# Patient Record
Sex: MALE | Race: BLACK OR AFRICAN AMERICAN | NOT HISPANIC OR LATINO | Employment: OTHER | RURAL
[De-identification: names, ages, dates, MRNs, and addresses within clinical notes are randomized per-mention and may not be internally consistent; named-entity substitution may affect disease eponyms.]

---

## 2017-01-25 ENCOUNTER — HISTORICAL (OUTPATIENT)
Dept: ADMINISTRATIVE | Facility: HOSPITAL | Age: 52
End: 2017-01-25

## 2017-01-26 LAB
LAB AP CLINICAL INFORMATION: NORMAL
LAB AP DIAGNOSIS - HISTORICAL: NORMAL
LAB AP GROSS PATHOLOGY - HISTORICAL: NORMAL
LAB AP SPECIMEN SUBMITTED - HISTORICAL: NORMAL

## 2017-02-16 ENCOUNTER — HISTORICAL (OUTPATIENT)
Dept: ADMINISTRATIVE | Facility: HOSPITAL | Age: 52
End: 2017-02-16

## 2020-10-19 ENCOUNTER — HISTORICAL (OUTPATIENT)
Dept: ADMINISTRATIVE | Facility: HOSPITAL | Age: 55
End: 2020-10-19

## 2020-10-19 LAB — SARS-COV+SARS-COV-2 AG RESP QL IA.RAPID: NEGATIVE

## 2021-04-14 RX ORDER — FAMOTIDINE 20 MG/1
20 TABLET, FILM COATED ORAL 2 TIMES DAILY
COMMUNITY

## 2021-04-14 RX ORDER — ASPIRIN 81 MG/1
81 TABLET ORAL DAILY
COMMUNITY

## 2021-04-14 RX ORDER — TAMSULOSIN HYDROCHLORIDE 0.4 MG/1
0.4 CAPSULE ORAL DAILY
COMMUNITY
End: 2023-05-22 | Stop reason: SDUPTHER

## 2021-04-14 RX ORDER — SILDENAFIL 100 MG/1
100 TABLET, FILM COATED ORAL DAILY PRN
COMMUNITY
End: 2022-09-27 | Stop reason: SDUPTHER

## 2021-04-14 RX ORDER — CETIRIZINE HYDROCHLORIDE 10 MG/1
10 TABLET ORAL DAILY
COMMUNITY

## 2021-04-14 RX ORDER — ASPIRIN 325 MG
50000 TABLET, DELAYED RELEASE (ENTERIC COATED) ORAL WEEKLY
COMMUNITY

## 2021-04-14 RX ORDER — CARVEDILOL 25 MG/1
25 TABLET ORAL 2 TIMES DAILY WITH MEALS
COMMUNITY
End: 2021-08-12 | Stop reason: SDUPTHER

## 2021-04-14 RX ORDER — ATORVASTATIN CALCIUM 20 MG/1
20 TABLET, FILM COATED ORAL DAILY
COMMUNITY
End: 2021-08-12 | Stop reason: SDUPTHER

## 2021-04-14 RX ORDER — CHLORTHALIDONE 25 MG/1
25 TABLET ORAL DAILY
COMMUNITY
End: 2021-08-12 | Stop reason: SDUPTHER

## 2021-04-14 RX ORDER — LISINOPRIL 20 MG/1
20 TABLET ORAL DAILY
COMMUNITY
End: 2022-02-10 | Stop reason: SDUPTHER

## 2021-06-28 ENCOUNTER — OFFICE VISIT (OUTPATIENT)
Dept: FAMILY MEDICINE | Facility: CLINIC | Age: 56
End: 2021-06-28

## 2021-06-28 VITALS
OXYGEN SATURATION: 95 % | WEIGHT: 292 LBS | TEMPERATURE: 99 F | DIASTOLIC BLOOD PRESSURE: 71 MMHG | SYSTOLIC BLOOD PRESSURE: 115 MMHG | BODY MASS INDEX: 39.55 KG/M2 | RESPIRATION RATE: 16 BRPM | HEIGHT: 72 IN | HEART RATE: 68 BPM

## 2021-06-28 DIAGNOSIS — Z12.11 COLON CANCER SCREENING: ICD-10-CM

## 2021-06-28 DIAGNOSIS — Z00.00 WELLNESS EXAMINATION: Primary | ICD-10-CM

## 2021-06-28 LAB
25(OH)D3 SERPL-MCNC: 26.5 NG/ML
ALBUMIN SERPL BCP-MCNC: 3.6 G/DL (ref 3.5–5)
ALBUMIN/GLOB SERPL: 0.9 {RATIO}
ALP SERPL-CCNC: 79 U/L (ref 45–115)
ALT SERPL W P-5'-P-CCNC: 66 U/L (ref 16–61)
ANION GAP SERPL CALCULATED.3IONS-SCNC: 8 MMOL/L (ref 7–16)
AST SERPL W P-5'-P-CCNC: 77 U/L (ref 15–37)
BASOPHILS # BLD AUTO: 0.03 K/UL (ref 0–0.2)
BASOPHILS NFR BLD AUTO: 0.6 % (ref 0–1)
BILIRUB SERPL-MCNC: 0.5 MG/DL (ref 0–1.2)
BILIRUB UR QL STRIP: NEGATIVE
BUN SERPL-MCNC: 16 MG/DL (ref 7–18)
BUN/CREAT SERPL: 21 (ref 6–20)
CALCIUM SERPL-MCNC: 9 MG/DL (ref 8.5–10.1)
CHLORIDE SERPL-SCNC: 103 MMOL/L (ref 98–107)
CHOLEST SERPL-MCNC: 139 MG/DL (ref 0–200)
CHOLEST/HDLC SERPL: 3.5 {RATIO}
CLARITY UR: ABNORMAL
CO2 SERPL-SCNC: 32 MMOL/L (ref 21–32)
COLOR UR: YELLOW
CREAT SERPL-MCNC: 0.75 MG/DL (ref 0.7–1.3)
DIFFERENTIAL METHOD BLD: ABNORMAL
EOSINOPHIL # BLD AUTO: 0.19 K/UL (ref 0–0.5)
EOSINOPHIL NFR BLD AUTO: 4.1 % (ref 1–4)
ERYTHROCYTE [DISTWIDTH] IN BLOOD BY AUTOMATED COUNT: 13.9 % (ref 11.5–14.5)
GLOBULIN SER-MCNC: 3.9 G/DL (ref 2–4)
GLUCOSE SERPL-MCNC: 91 MG/DL (ref 74–106)
GLUCOSE UR STRIP-MCNC: NEGATIVE MG/DL
HCT VFR BLD AUTO: 48.3 % (ref 40–54)
HDLC SERPL-MCNC: 40 MG/DL (ref 40–60)
HGB BLD-MCNC: 16 G/DL (ref 13.5–18)
IMM GRANULOCYTES # BLD AUTO: 0.02 K/UL (ref 0–0.04)
IMM GRANULOCYTES NFR BLD: 0.4 % (ref 0–0.4)
KETONES UR STRIP-SCNC: NEGATIVE MG/DL
LDLC SERPL CALC-MCNC: 72 MG/DL
LDLC/HDLC SERPL: 1.8 {RATIO}
LEUKOCYTE ESTERASE UR QL STRIP: NEGATIVE
LYMPHOCYTES # BLD AUTO: 1.72 K/UL (ref 1–4.8)
LYMPHOCYTES NFR BLD AUTO: 36.7 % (ref 27–41)
MCH RBC QN AUTO: 30.2 PG (ref 27–31)
MCHC RBC AUTO-ENTMCNC: 33.1 G/DL (ref 32–36)
MCV RBC AUTO: 91.1 FL (ref 80–96)
MONOCYTES # BLD AUTO: 0.58 K/UL (ref 0–0.8)
MONOCYTES NFR BLD AUTO: 12.4 % (ref 2–6)
MPC BLD CALC-MCNC: 12 FL (ref 9.4–12.4)
NEUTROPHILS # BLD AUTO: 2.15 K/UL (ref 1.8–7.7)
NEUTROPHILS NFR BLD AUTO: 45.8 % (ref 53–65)
NITRITE UR QL STRIP: NEGATIVE
NONHDLC SERPL-MCNC: 99 MG/DL
NRBC # BLD AUTO: 0 X10E3/UL
NRBC, AUTO (.00): 0 %
PH UR STRIP: 6 PH UNITS
PLATELET # BLD AUTO: 231 K/UL (ref 150–400)
POTASSIUM SERPL-SCNC: 3.3 MMOL/L (ref 3.5–5.1)
PROT SERPL-MCNC: 7.5 G/DL (ref 6.4–8.2)
PROT UR QL STRIP: NEGATIVE
PSA SERPL-MCNC: 0.96 NG/ML (ref 0–3.1)
RBC # BLD AUTO: 5.3 M/UL (ref 4.6–6.2)
RBC # UR STRIP: NEGATIVE /UL
SODIUM SERPL-SCNC: 140 MMOL/L (ref 136–145)
SP GR UR STRIP: 1.02
T4 FREE SERPL-MCNC: 0.72 NG/DL (ref 0.76–1.46)
TRIGL SERPL-MCNC: 137 MG/DL (ref 35–150)
TSH SERPL DL<=0.005 MIU/L-ACNC: 1.69 UIU/ML (ref 0.36–3.74)
UROBILINOGEN UR STRIP-ACNC: 2 MG/DL
VLDLC SERPL-MCNC: 27 MG/DL
WBC # BLD AUTO: 4.69 K/UL (ref 4.5–11)

## 2021-06-28 PROCEDURE — 85025 COMPLETE CBC W/AUTO DIFF WBC: CPT | Mod: ,,, | Performed by: CLINICAL MEDICAL LABORATORY

## 2021-06-28 PROCEDURE — 84439 ASSAY OF FREE THYROXINE: CPT | Mod: ,,, | Performed by: CLINICAL MEDICAL LABORATORY

## 2021-06-28 PROCEDURE — 85025 CBC WITH DIFFERENTIAL: ICD-10-PCS | Mod: ,,, | Performed by: CLINICAL MEDICAL LABORATORY

## 2021-06-28 PROCEDURE — 80061 LIPID PANEL: ICD-10-PCS | Mod: ,,, | Performed by: CLINICAL MEDICAL LABORATORY

## 2021-06-28 PROCEDURE — 84443 TSH: ICD-10-PCS | Mod: ,,, | Performed by: CLINICAL MEDICAL LABORATORY

## 2021-06-28 PROCEDURE — 84439 T4, FREE: ICD-10-PCS | Mod: ,,, | Performed by: CLINICAL MEDICAL LABORATORY

## 2021-06-28 PROCEDURE — 82306 VITAMIN D: ICD-10-PCS | Mod: ,,, | Performed by: CLINICAL MEDICAL LABORATORY

## 2021-06-28 PROCEDURE — 80053 COMPREHEN METABOLIC PANEL: CPT | Mod: ,,, | Performed by: CLINICAL MEDICAL LABORATORY

## 2021-06-28 PROCEDURE — 81003 URINALYSIS: ICD-10-PCS | Mod: QW,,, | Performed by: CLINICAL MEDICAL LABORATORY

## 2021-06-28 PROCEDURE — 81003 URINALYSIS AUTO W/O SCOPE: CPT | Mod: QW,,, | Performed by: CLINICAL MEDICAL LABORATORY

## 2021-06-28 PROCEDURE — 99396 PREV VISIT EST AGE 40-64: CPT | Mod: ,,, | Performed by: FAMILY MEDICINE

## 2021-06-28 PROCEDURE — 80053 COMPREHENSIVE METABOLIC PANEL: ICD-10-PCS | Mod: ,,, | Performed by: CLINICAL MEDICAL LABORATORY

## 2021-06-28 PROCEDURE — 84443 ASSAY THYROID STIM HORMONE: CPT | Mod: ,,, | Performed by: CLINICAL MEDICAL LABORATORY

## 2021-06-28 PROCEDURE — 82306 VITAMIN D 25 HYDROXY: CPT | Mod: ,,, | Performed by: CLINICAL MEDICAL LABORATORY

## 2021-06-28 PROCEDURE — G0103 PSA SCREENING: HCPCS | Mod: ,,, | Performed by: CLINICAL MEDICAL LABORATORY

## 2021-06-28 PROCEDURE — 80061 LIPID PANEL: CPT | Mod: ,,, | Performed by: CLINICAL MEDICAL LABORATORY

## 2021-06-28 PROCEDURE — G0103 PSA, SCREENING: ICD-10-PCS | Mod: ,,, | Performed by: CLINICAL MEDICAL LABORATORY

## 2021-06-28 PROCEDURE — 99396 PR PREVENTIVE VISIT,EST,40-64: ICD-10-PCS | Mod: ,,, | Performed by: FAMILY MEDICINE

## 2021-06-29 DIAGNOSIS — Z12.11 ENCOUNTER FOR SCREENING COLONOSCOPY: Primary | ICD-10-CM

## 2021-08-12 ENCOUNTER — OFFICE VISIT (OUTPATIENT)
Dept: FAMILY MEDICINE | Facility: CLINIC | Age: 56
End: 2021-08-12

## 2021-08-12 VITALS
DIASTOLIC BLOOD PRESSURE: 109 MMHG | SYSTOLIC BLOOD PRESSURE: 168 MMHG | RESPIRATION RATE: 20 BRPM | HEIGHT: 72 IN | HEART RATE: 76 BPM | WEIGHT: 293 LBS | OXYGEN SATURATION: 96 % | BODY MASS INDEX: 39.68 KG/M2

## 2021-08-12 DIAGNOSIS — E78.5 HYPERLIPIDEMIA, UNSPECIFIED HYPERLIPIDEMIA TYPE: ICD-10-CM

## 2021-08-12 DIAGNOSIS — I10 ESSENTIAL HYPERTENSION: Primary | ICD-10-CM

## 2021-08-12 DIAGNOSIS — E87.6 HYPOKALEMIA: ICD-10-CM

## 2021-08-12 PROCEDURE — 99214 OFFICE O/P EST MOD 30 MIN: CPT | Mod: ,,, | Performed by: INTERNAL MEDICINE

## 2021-08-12 PROCEDURE — 99214 PR OFFICE/OUTPT VISIT, EST, LEVL IV, 30-39 MIN: ICD-10-PCS | Mod: ,,, | Performed by: INTERNAL MEDICINE

## 2021-08-12 RX ORDER — CHLORTHALIDONE 25 MG/1
25 TABLET ORAL DAILY
Qty: 90 TABLET | Refills: 1 | Status: SHIPPED | OUTPATIENT
Start: 2021-08-12 | End: 2021-11-15

## 2021-08-12 RX ORDER — CARVEDILOL 25 MG/1
25 TABLET ORAL 2 TIMES DAILY WITH MEALS
Qty: 180 TABLET | Refills: 1 | Status: SHIPPED | OUTPATIENT
Start: 2021-08-12 | End: 2022-02-10 | Stop reason: SDUPTHER

## 2021-08-12 RX ORDER — ATORVASTATIN CALCIUM 20 MG/1
20 TABLET, FILM COATED ORAL DAILY
Qty: 90 TABLET | Refills: 1 | Status: SHIPPED | OUTPATIENT
Start: 2021-08-12 | End: 2021-11-15

## 2021-12-16 ENCOUNTER — OFFICE VISIT (OUTPATIENT)
Dept: FAMILY MEDICINE | Facility: CLINIC | Age: 56
End: 2021-12-16

## 2021-12-16 VITALS
SYSTOLIC BLOOD PRESSURE: 134 MMHG | RESPIRATION RATE: 20 BRPM | WEIGHT: 294.63 LBS | BODY MASS INDEX: 39.91 KG/M2 | OXYGEN SATURATION: 99 % | HEART RATE: 80 BPM | DIASTOLIC BLOOD PRESSURE: 90 MMHG | HEIGHT: 72 IN

## 2021-12-16 DIAGNOSIS — S99.911A INJURY OF RIGHT ANKLE, INITIAL ENCOUNTER: Primary | ICD-10-CM

## 2021-12-16 DIAGNOSIS — M25.512 LEFT SHOULDER PAIN, UNSPECIFIED CHRONICITY: ICD-10-CM

## 2021-12-16 DIAGNOSIS — I10 ESSENTIAL HYPERTENSION: Chronic | ICD-10-CM

## 2021-12-16 DIAGNOSIS — R35.0 URINARY FREQUENCY: ICD-10-CM

## 2021-12-16 DIAGNOSIS — Z12.5 SCREENING FOR PROSTATE CANCER: ICD-10-CM

## 2021-12-16 LAB
ANION GAP SERPL CALCULATED.3IONS-SCNC: 7 MMOL/L (ref 7–16)
BASOPHILS # BLD AUTO: 0.03 K/UL (ref 0–0.2)
BASOPHILS NFR BLD AUTO: 0.6 % (ref 0–1)
BILIRUB UR QL STRIP: NEGATIVE
BUN SERPL-MCNC: 19 MG/DL (ref 7–18)
BUN/CREAT SERPL: 20 (ref 6–20)
CALCIUM SERPL-MCNC: 9.6 MG/DL (ref 8.5–10.1)
CHLORIDE SERPL-SCNC: 104 MMOL/L (ref 98–107)
CLARITY UR: ABNORMAL
CO2 SERPL-SCNC: 33 MMOL/L (ref 21–32)
COLOR UR: YELLOW
CREAT SERPL-MCNC: 0.96 MG/DL (ref 0.7–1.3)
DIFFERENTIAL METHOD BLD: ABNORMAL
EOSINOPHIL # BLD AUTO: 0.22 K/UL (ref 0–0.5)
EOSINOPHIL NFR BLD AUTO: 4.2 % (ref 1–4)
ERYTHROCYTE [DISTWIDTH] IN BLOOD BY AUTOMATED COUNT: 13.6 % (ref 11.5–14.5)
GLUCOSE SERPL-MCNC: 93 MG/DL (ref 74–106)
GLUCOSE UR STRIP-MCNC: NEGATIVE MG/DL
HCT VFR BLD AUTO: 48.8 % (ref 40–54)
HGB BLD-MCNC: 16.4 G/DL (ref 13.5–18)
IMM GRANULOCYTES # BLD AUTO: 0.01 K/UL (ref 0–0.04)
IMM GRANULOCYTES NFR BLD: 0.2 % (ref 0–0.4)
KETONES UR STRIP-SCNC: ABNORMAL MG/DL
LEUKOCYTE ESTERASE UR QL STRIP: NEGATIVE
LYMPHOCYTES # BLD AUTO: 1.9 K/UL (ref 1–4.8)
LYMPHOCYTES NFR BLD AUTO: 35.8 % (ref 27–41)
MCH RBC QN AUTO: 31.1 PG (ref 27–31)
MCHC RBC AUTO-ENTMCNC: 33.6 G/DL (ref 32–36)
MCV RBC AUTO: 92.4 FL (ref 80–96)
MONOCYTES # BLD AUTO: 0.74 K/UL (ref 0–0.8)
MONOCYTES NFR BLD AUTO: 14 % (ref 2–6)
MPC BLD CALC-MCNC: 12 FL (ref 9.4–12.4)
NEUTROPHILS # BLD AUTO: 2.4 K/UL (ref 1.8–7.7)
NEUTROPHILS NFR BLD AUTO: 45.2 % (ref 53–65)
NITRITE UR QL STRIP: NEGATIVE
NRBC # BLD AUTO: 0 X10E3/UL
NRBC, AUTO (.00): 0 %
PH UR STRIP: 6 PH UNITS
PLATELET # BLD AUTO: 230 K/UL (ref 150–400)
POTASSIUM SERPL-SCNC: 3.4 MMOL/L (ref 3.5–5.1)
PROT UR QL STRIP: NEGATIVE
PSA SERPL-MCNC: 1.01 NG/ML (ref 0–3.1)
RBC # BLD AUTO: 5.28 M/UL (ref 4.6–6.2)
RBC # UR STRIP: NEGATIVE /UL
SODIUM SERPL-SCNC: 141 MMOL/L (ref 136–145)
SP GR UR STRIP: 1.02
UROBILINOGEN UR STRIP-ACNC: 4 MG/DL
WBC # BLD AUTO: 5.3 K/UL (ref 4.5–11)

## 2021-12-16 PROCEDURE — 99214 OFFICE O/P EST MOD 30 MIN: CPT | Mod: ,,, | Performed by: INTERNAL MEDICINE

## 2021-12-16 PROCEDURE — 85025 COMPLETE CBC W/AUTO DIFF WBC: CPT | Mod: ,,, | Performed by: CLINICAL MEDICAL LABORATORY

## 2021-12-16 PROCEDURE — 99214 PR OFFICE/OUTPT VISIT, EST, LEVL IV, 30-39 MIN: ICD-10-PCS | Mod: ,,, | Performed by: INTERNAL MEDICINE

## 2021-12-16 PROCEDURE — 81003 URINALYSIS, REFLEX TO URINE CULTURE: ICD-10-PCS | Mod: QW,,, | Performed by: CLINICAL MEDICAL LABORATORY

## 2021-12-16 PROCEDURE — 81003 URINALYSIS AUTO W/O SCOPE: CPT | Mod: QW,,, | Performed by: CLINICAL MEDICAL LABORATORY

## 2021-12-16 PROCEDURE — 80048 BASIC METABOLIC PNL TOTAL CA: CPT | Mod: ,,, | Performed by: CLINICAL MEDICAL LABORATORY

## 2021-12-16 PROCEDURE — 85025 CBC WITH DIFFERENTIAL: ICD-10-PCS | Mod: ,,, | Performed by: CLINICAL MEDICAL LABORATORY

## 2021-12-16 PROCEDURE — G0103 PSA, SCREENING: ICD-10-PCS | Mod: ,,, | Performed by: CLINICAL MEDICAL LABORATORY

## 2021-12-16 PROCEDURE — G0103 PSA SCREENING: HCPCS | Mod: ,,, | Performed by: CLINICAL MEDICAL LABORATORY

## 2021-12-16 PROCEDURE — 80048 BASIC METABOLIC PANEL: ICD-10-PCS | Mod: ,,, | Performed by: CLINICAL MEDICAL LABORATORY

## 2021-12-16 RX ORDER — NAPROXEN 500 MG/1
500 TABLET ORAL 2 TIMES DAILY PRN
Qty: 20 TABLET | Refills: 0 | Status: SHIPPED | OUTPATIENT
Start: 2021-12-16 | End: 2024-01-04

## 2021-12-30 ENCOUNTER — OFFICE VISIT (OUTPATIENT)
Dept: FAMILY MEDICINE | Facility: CLINIC | Age: 56
End: 2021-12-30

## 2021-12-30 VITALS
RESPIRATION RATE: 20 BRPM | HEIGHT: 72 IN | DIASTOLIC BLOOD PRESSURE: 95 MMHG | HEART RATE: 80 BPM | OXYGEN SATURATION: 95 % | WEIGHT: 294 LBS | BODY MASS INDEX: 39.82 KG/M2 | SYSTOLIC BLOOD PRESSURE: 144 MMHG

## 2021-12-30 DIAGNOSIS — E66.01 MORBID OBESITY: ICD-10-CM

## 2021-12-30 DIAGNOSIS — M19.012 OSTEOARTHRITIS OF LEFT SHOULDER, UNSPECIFIED OSTEOARTHRITIS TYPE: ICD-10-CM

## 2021-12-30 DIAGNOSIS — I10 ESSENTIAL HYPERTENSION: Primary | ICD-10-CM

## 2021-12-30 DIAGNOSIS — M19.071 DJD (DEGENERATIVE JOINT DISEASE), ANKLE AND FOOT, RIGHT: ICD-10-CM

## 2021-12-30 PROCEDURE — 99214 OFFICE O/P EST MOD 30 MIN: CPT | Mod: ,,, | Performed by: INTERNAL MEDICINE

## 2021-12-30 PROCEDURE — 99214 PR OFFICE/OUTPT VISIT, EST, LEVL IV, 30-39 MIN: ICD-10-PCS | Mod: ,,, | Performed by: INTERNAL MEDICINE

## 2021-12-30 RX ORDER — PHENTERMINE HYDROCHLORIDE 37.5 MG/1
37.5 TABLET ORAL
Qty: 30 TABLET | Refills: 0 | Status: SHIPPED | OUTPATIENT
Start: 2021-12-30 | End: 2022-01-29

## 2021-12-30 RX ORDER — NAPROXEN 500 MG/1
500 TABLET ORAL 2 TIMES DAILY PRN
Qty: 20 TABLET | Refills: 1 | Status: SHIPPED | OUTPATIENT
Start: 2021-12-30 | End: 2024-01-04

## 2021-12-30 NOTE — PROGRESS NOTES
Subjective:       Patient ID: Juan C Carranza is a 56 y.o. male.    Chief Complaint: Follow-up and Results    Patient is here today for check up evlauation. Patient recent labs reviewed and shows potassium level slightly low. Recommended to increase intake of potassium rich foods. All other labs reviewed and within normal limits. Patient recent xray of shoulder reviewed and shows mild arthritis as well as xray of ankle. Recommend weight loss and OTC extra strength tylenol. Discussed with patient about diet and exercise and discussed about starting adipex. Will try adipex for 6 months and follow monthly for progress. Will follow patient in 1 month.       Current Medications:    Current Outpatient Medications:     aspirin (ECOTRIN) 81 MG EC tablet, Take 81 mg by mouth once daily., Disp: , Rfl:     atorvastatin (LIPITOR) 20 MG tablet, TAKE 1 TABLET (20 MG TOTAL) BY MOUTH ONCE DAILY., Disp: 90 tablet, Rfl: 1    baloxavir marboxiL (XOFLUZA) 40 mg tablet, Take 40 mg by mouth. TAKE 2 TABS DAILY, Disp: , Rfl:     carvediloL (COREG) 25 MG tablet, Take 1 tablet (25 mg total) by mouth 2 (two) times daily with meals., Disp: 180 tablet, Rfl: 1    cetirizine (ZYRTEC) 10 MG tablet, Take 10 mg by mouth once daily., Disp: , Rfl:     chlorthalidone (HYGROTEN) 25 MG Tab, TAKE 1 TABLET (25 MG TOTAL) BY MOUTH ONCE DAILY., Disp: 90 tablet, Rfl: 1    cholecalciferol, vitamin D3, 1,250 mcg (50,000 unit) capsule, Take 50,000 Units by mouth once a week., Disp: , Rfl:     famotidine (PEPCID) 20 MG tablet, Take 20 mg by mouth 2 (two) times daily., Disp: , Rfl:     lisinopriL (PRINIVIL,ZESTRIL) 20 MG tablet, Take 20 mg by mouth once daily., Disp: , Rfl:     naproxen (NAPROSYN) 500 MG tablet, Take 1 tablet (500 mg total) by mouth 2 (two) times daily as needed., Disp: 20 tablet, Rfl: 0    sildenafiL (VIAGRA) 100 MG tablet, Take 100 mg by mouth daily as needed for Erectile Dysfunction., Disp: , Rfl:     tamsulosin (FLOMAX) 0.4 mg Cap,  Take 0.4 mg by mouth once daily., Disp: , Rfl:     Last Labs:     Office Visit on 12/16/2021   Component Date Value    Sodium 12/16/2021 141     Potassium 12/16/2021 3.4*    Chloride 12/16/2021 104     CO2 12/16/2021 33*    Anion Gap 12/16/2021 7     Glucose 12/16/2021 93     BUN 12/16/2021 19*    Creatinine 12/16/2021 0.96     BUN/Creatinine Ratio 12/16/2021 20     Calcium 12/16/2021 9.6     eGFR  12/16/2021 104     PSA Total 12/16/2021 1.010     Color, UA 12/16/2021 Yellow     Clarity, UA 12/16/2021 Turbid*    pH, UA 12/16/2021 6.0     Leukocytes, UA 12/16/2021 Negative     Nitrites, UA 12/16/2021 Negative     Protein, UA 12/16/2021 Negative     Glucose, UA 12/16/2021 Negative     Ketones, UA 12/16/2021 Trace     Urobilinogen, UA 12/16/2021 4.0*    Bilirubin, UA 12/16/2021 Negative     Blood, UA 12/16/2021 Negative     Specific Gravity, UA 12/16/2021 1.025     WBC 12/16/2021 5.30     RBC 12/16/2021 5.28     Hemoglobin 12/16/2021 16.4     Hematocrit 12/16/2021 48.8     MCV 12/16/2021 92.4     MCH 12/16/2021 31.1*    MCHC 12/16/2021 33.6     RDW 12/16/2021 13.6     Platelet Count 12/16/2021 230     MPV 12/16/2021 12.0     Neutrophils % 12/16/2021 45.2*    Lymphocytes % 12/16/2021 35.8     Monocytes % 12/16/2021 14.0*    Eosinophils % 12/16/2021 4.2*    Basophils % 12/16/2021 0.6     Immature Granulocytes % 12/16/2021 0.2     nRBC, Auto 12/16/2021 0.0     Neutrophils, Abs 12/16/2021 2.40     Lymphocytes, Absolute 12/16/2021 1.90     Monocytes, Absolute 12/16/2021 0.74     Eosinophils, Absolute 12/16/2021 0.22     Basophils, Absolute 12/16/2021 0.03     Immature Granulocytes, A* 12/16/2021 0.01     nRBC, Absolute 12/16/2021 0.00     Diff Type 12/16/2021 Auto        Last Imaging:  X-Ray Ankle Complete 3 View Right  Narrative: EXAMINATION:  XR ANKLE COMPLETE 3 VIEW RIGHT    CLINICAL HISTORY:  Unspecified injury of right ankle, initial  encounter    TECHNIQUE:  AP, lateral, and oblique images of the right ankle were performed.    COMPARISON:  None    FINDINGS:  There is no fracture or dislocation.  There are mild degenerative changes seen with ankle calcaneal enthesophytes.  Impression: Mild degenerative changes.    Electronically signed by: Luis E Sequeira  Date:    12/16/2021  Time:    16:14  X-Ray Shoulder 2 or More Views Left  Narrative: EXAMINATION:  XR SHOULDER COMPLETE 2 OR MORE VIEWS LEFT    CLINICAL HISTORY:  Pain in left shoulder    TECHNIQUE:  Two or three views of the left shoulder were performed.    COMPARISON:  None    FINDINGS:  There is no fracture.  No dislocation.  Mild degenerative glenohumeral and acromioclavicular changes.  Impression: Degenerative changes.    Electronically signed by: Luis E Sequeira  Date:    12/16/2021  Time:    16:14         Review of Systems   Musculoskeletal: Positive for arthralgias.   All other systems reviewed and are negative.        Objective:      Physical Exam  Vitals reviewed.   Constitutional:       Appearance: Normal appearance.   Cardiovascular:      Rate and Rhythm: Normal rate and regular rhythm.      Pulses: Normal pulses.      Heart sounds: Normal heart sounds.   Pulmonary:      Effort: Pulmonary effort is normal.      Breath sounds: Normal breath sounds.   Abdominal:      General: Abdomen is flat. Bowel sounds are normal.      Palpations: Abdomen is soft.   Musculoskeletal:         General: Normal range of motion.      Cervical back: Normal range of motion and neck supple.   Skin:     General: Skin is warm and dry.   Neurological:      General: No focal deficit present.      Mental Status: He is alert and oriented to person, place, and time. Mental status is at baseline.         Assessment:       1. Essential hypertension     2. DJD (degenerative joint disease), ankle and foot, right     3. Osteoarthritis of left shoulder, unspecified osteoarthritis type     4. Morbid obesity          Plan:          Juan C was seen today for follow-up and results.    Diagnoses and all orders for this visit:    Essential hypertension    DJD (degenerative joint disease), ankle and foot, right    Osteoarthritis of left shoulder, unspecified osteoarthritis type    Morbid obesity    Other orders  The following orders have not been finalized:  -     naproxen (NAPROSYN) 500 MG tablet  -     phentermine (ADIPEX-P) 37.5 mg tablet

## 2021-12-30 NOTE — PATIENT INSTRUCTIONS
Juan C was seen today for follow-up and results.    Diagnoses and all orders for this visit:    Essential hypertension    DJD (degenerative joint disease), ankle and foot, right    Osteoarthritis of left shoulder, unspecified osteoarthritis type    Morbid obesity    Other orders  The following orders have not been finalized:  -     naproxen (NAPROSYN) 500 MG tablet  -     phentermine (ADIPEX-P) 37.5 mg tablet

## 2022-02-10 NOTE — TELEPHONE ENCOUNTER
----- Message from Jessica Cordova sent at 2/10/2022  3:05 PM CST -----  Patient called requesting a refill. Patient needs a refill on cholesterol and blood pressure. He prefers the [facility] in [place]     [phone]

## 2022-02-11 RX ORDER — LISINOPRIL 20 MG/1
20 TABLET ORAL DAILY
Qty: 90 TABLET | Refills: 1 | Status: SHIPPED | OUTPATIENT
Start: 2022-02-11

## 2022-02-11 RX ORDER — ATORVASTATIN CALCIUM 20 MG/1
20 TABLET, FILM COATED ORAL DAILY
Qty: 90 TABLET | Refills: 1 | Status: SHIPPED | OUTPATIENT
Start: 2022-02-11 | End: 2022-05-27

## 2022-02-11 RX ORDER — CHLORTHALIDONE 25 MG/1
25 TABLET ORAL DAILY
Qty: 90 TABLET | Refills: 1 | Status: SHIPPED | OUTPATIENT
Start: 2022-02-11 | End: 2022-08-15 | Stop reason: SDUPTHER

## 2022-02-11 RX ORDER — CARVEDILOL 25 MG/1
25 TABLET ORAL 2 TIMES DAILY WITH MEALS
Qty: 180 TABLET | Refills: 1 | Status: SHIPPED | OUTPATIENT
Start: 2022-02-11 | End: 2022-11-15

## 2022-04-01 ENCOUNTER — OFFICE VISIT (OUTPATIENT)
Dept: FAMILY MEDICINE | Facility: CLINIC | Age: 57
End: 2022-04-01

## 2022-04-01 ENCOUNTER — HOSPITAL ENCOUNTER (OUTPATIENT)
Dept: RADIOLOGY | Facility: HOSPITAL | Age: 57
Discharge: HOME | End: 2022-04-01
Attending: INTERNAL MEDICINE

## 2022-04-01 VITALS
TEMPERATURE: 99 F | OXYGEN SATURATION: 97 % | BODY MASS INDEX: 38.92 KG/M2 | SYSTOLIC BLOOD PRESSURE: 121 MMHG | WEIGHT: 287.38 LBS | HEIGHT: 72 IN | HEART RATE: 79 BPM | DIASTOLIC BLOOD PRESSURE: 86 MMHG | RESPIRATION RATE: 18 BRPM

## 2022-04-01 DIAGNOSIS — K11.20 PAROTITIS: Primary | ICD-10-CM

## 2022-04-01 DIAGNOSIS — K11.20 PAROTITIS: ICD-10-CM

## 2022-04-01 LAB
BASOPHILS # BLD AUTO: 0.02 K/UL (ref 0–0.2)
BASOPHILS NFR BLD AUTO: 0.4 % (ref 0–1)
DIFFERENTIAL METHOD BLD: ABNORMAL
EOSINOPHIL # BLD AUTO: 0.09 K/UL (ref 0–0.5)
EOSINOPHIL NFR BLD AUTO: 1.8 % (ref 1–4)
ERYTHROCYTE [DISTWIDTH] IN BLOOD BY AUTOMATED COUNT: 13.6 % (ref 11.5–14.5)
HCT VFR BLD AUTO: 49.5 % (ref 40–54)
HGB BLD-MCNC: 16.3 G/DL (ref 13.5–18)
IMM GRANULOCYTES # BLD AUTO: 0.01 K/UL (ref 0–0.04)
IMM GRANULOCYTES NFR BLD: 0.2 % (ref 0–0.4)
LYMPHOCYTES # BLD AUTO: 1.6 K/UL (ref 1–4.8)
LYMPHOCYTES NFR BLD AUTO: 31.9 % (ref 27–41)
MCH RBC QN AUTO: 30.5 PG (ref 27–31)
MCHC RBC AUTO-ENTMCNC: 32.9 G/DL (ref 32–36)
MCV RBC AUTO: 92.5 FL (ref 80–96)
MONOCYTES # BLD AUTO: 0.67 K/UL (ref 0–0.8)
MONOCYTES NFR BLD AUTO: 13.4 % (ref 2–6)
MPC BLD CALC-MCNC: 12.1 FL (ref 9.4–12.4)
NEUTROPHILS # BLD AUTO: 2.62 K/UL (ref 1.8–7.7)
NEUTROPHILS NFR BLD AUTO: 52.3 % (ref 53–65)
NRBC # BLD AUTO: 0 X10E3/UL
NRBC, AUTO (.00): 0 %
PLATELET # BLD AUTO: 218 K/UL (ref 150–400)
RBC # BLD AUTO: 5.35 M/UL (ref 4.6–6.2)
WBC # BLD AUTO: 5.01 K/UL (ref 4.5–11)

## 2022-04-01 PROCEDURE — 70150 XR FACIAL BONES 3 OR MORE VIEW: ICD-10-PCS | Mod: 26,,, | Performed by: RADIOLOGY

## 2022-04-01 PROCEDURE — 85025 CBC WITH DIFFERENTIAL: ICD-10-PCS | Mod: ,,, | Performed by: CLINICAL MEDICAL LABORATORY

## 2022-04-01 PROCEDURE — 70150 X-RAY EXAM OF FACIAL BONES: CPT | Mod: TC

## 2022-04-01 PROCEDURE — 70150 X-RAY EXAM OF FACIAL BONES: CPT | Mod: 26,,, | Performed by: RADIOLOGY

## 2022-04-01 PROCEDURE — 99214 OFFICE O/P EST MOD 30 MIN: CPT | Mod: ,,, | Performed by: INTERNAL MEDICINE

## 2022-04-01 PROCEDURE — 99214 PR OFFICE/OUTPT VISIT, EST, LEVL IV, 30-39 MIN: ICD-10-PCS | Mod: ,,, | Performed by: INTERNAL MEDICINE

## 2022-04-01 PROCEDURE — 85025 COMPLETE CBC W/AUTO DIFF WBC: CPT | Mod: ,,, | Performed by: CLINICAL MEDICAL LABORATORY

## 2022-04-01 RX ORDER — SULFAMETHOXAZOLE AND TRIMETHOPRIM 800; 160 MG/1; MG/1
1 TABLET ORAL 2 TIMES DAILY
Qty: 20 TABLET | Refills: 0 | Status: SHIPPED | OUTPATIENT
Start: 2022-04-01 | End: 2022-04-05 | Stop reason: SDUPTHER

## 2022-04-04 NOTE — PROGRESS NOTES
Subjective:       Patient ID: Juan C Carranza is a 56 y.o. male.    Chief Complaint: raised area left neck (Hurts to chew, no trouble swallowing)    Patient is here today for a follow up evaluation. Patient has a complaint of a knot under his left jawbone and near his left ear. Patient states he noticed it 7 days ago. Will order X-ray left sided facial series and STAT CT left side facial. Will refer to ENT STAT. Will prescribe Bactrim DS PO BID #20. Will follow in 4 days.       Current Medications:    Current Outpatient Medications:     aspirin (ECOTRIN) 81 MG EC tablet, Take 81 mg by mouth once daily., Disp: , Rfl:     atorvastatin (LIPITOR) 20 MG tablet, Take 1 tablet (20 mg total) by mouth once daily., Disp: 90 tablet, Rfl: 1    baloxavir marboxiL (XOFLUZA) 40 mg tablet, Take 40 mg by mouth. TAKE 2 TABS DAILY, Disp: , Rfl:     carvediloL (COREG) 25 MG tablet, Take 1 tablet (25 mg total) by mouth 2 (two) times daily with meals., Disp: 180 tablet, Rfl: 1    cetirizine (ZYRTEC) 10 MG tablet, Take 10 mg by mouth once daily., Disp: , Rfl:     chlorthalidone (HYGROTEN) 25 MG Tab, Take 1 tablet (25 mg total) by mouth once daily., Disp: 90 tablet, Rfl: 1    cholecalciferol, vitamin D3, 1,250 mcg (50,000 unit) capsule, Take 50,000 Units by mouth once a week., Disp: , Rfl:     famotidine (PEPCID) 20 MG tablet, Take 20 mg by mouth 2 (two) times daily., Disp: , Rfl:     lisinopriL (PRINIVIL,ZESTRIL) 20 MG tablet, Take 1 tablet (20 mg total) by mouth once daily., Disp: 90 tablet, Rfl: 1    naproxen (NAPROSYN) 500 MG tablet, Take 1 tablet (500 mg total) by mouth 2 (two) times daily as needed., Disp: 20 tablet, Rfl: 0    naproxen (NAPROSYN) 500 MG tablet, Take 1 tablet (500 mg total) by mouth 2 (two) times daily as needed., Disp: 20 tablet, Rfl: 1    sildenafiL (VIAGRA) 100 MG tablet, Take 100 mg by mouth daily as needed for Erectile Dysfunction., Disp: , Rfl:     sulfamethoxazole-trimethoprim 800-160mg (BACTRIM DS)  800-160 mg Tab, Take 1 tablet by mouth 2 (two) times daily., Disp: 20 tablet, Rfl: 0    tamsulosin (FLOMAX) 0.4 mg Cap, Take 0.4 mg by mouth once daily., Disp: , Rfl:     Last Labs:     Office Visit on 04/01/2022   Component Date Value    WBC 04/01/2022 5.01     RBC 04/01/2022 5.35     Hemoglobin 04/01/2022 16.3     Hematocrit 04/01/2022 49.5     MCV 04/01/2022 92.5     MCH 04/01/2022 30.5     MCHC 04/01/2022 32.9     RDW 04/01/2022 13.6     Platelet Count 04/01/2022 218     MPV 04/01/2022 12.1     Neutrophils % 04/01/2022 52.3 (A)    Lymphocytes % 04/01/2022 31.9     Monocytes % 04/01/2022 13.4 (A)    Eosinophils % 04/01/2022 1.8     Basophils % 04/01/2022 0.4     Immature Granulocytes % 04/01/2022 0.2     nRBC, Auto 04/01/2022 0.0     Neutrophils, Abs 04/01/2022 2.62     Lymphocytes, Absolute 04/01/2022 1.60     Monocytes, Absolute 04/01/2022 0.67     Eosinophils, Absolute 04/01/2022 0.09     Basophils, Absolute 04/01/2022 0.02     Immature Granulocytes, A* 04/01/2022 0.01     nRBC, Absolute 04/01/2022 0.00     Diff Type 04/01/2022 Auto        Last Imaging:  X-Ray Facial Bones  3 Or More View  Narrative: EXAMINATION:  XR FACIAL BONES 3 OR MORE VIEW    CLINICAL HISTORY:  left side facial edema;  Sialoadenitis, unspecified    TECHNIQUE:  Four views of the facial bones were performed.    COMPARISON:  None    FINDINGS:  There is no fracture identified or radiopaque foreign body.  The soft tissues are not well evaluated by radiograph although no soft tissue calcification is detected.  Impression: As above    Electronically signed by: Luis E Sequeira  Date:    04/01/2022  Time:    11:46         Review of Systems   Integumentary:  Positive for mole/lesion.   All other systems reviewed and are negative.        Objective:      Physical Exam  Constitutional:       Appearance: Normal appearance. He is normal weight.   Cardiovascular:      Rate and Rhythm: Normal rate and regular rhythm.      Pulses:  Normal pulses.      Heart sounds: Normal heart sounds.   Pulmonary:      Effort: Pulmonary effort is normal.      Breath sounds: Normal breath sounds.   Abdominal:      General: Abdomen is flat. Bowel sounds are normal.      Palpations: Abdomen is soft.   Musculoskeletal:         General: Normal range of motion.      Cervical back: Normal range of motion and neck supple.   Skin:     General: Skin is warm and dry.      Findings: Lesion present.   Neurological:      General: No focal deficit present.      Mental Status: He is alert and oriented to person, place, and time. Mental status is at baseline.         Assessment:       1. Parotitis  CBC Auto Differential    X-Ray Facial Bones  3 Or More View    CBC Auto Differential    Ambulatory referral/consult to ENT    CT Maxillofacial W WO Contrast        Plan:         Juan C was seen today for raised area left neck.    Diagnoses and all orders for this visit:    Parotitis  -     CBC Auto Differential; Future  -     X-Ray Facial Bones  3 Or More View; Future  -     CBC Auto Differential  -     Ambulatory referral/consult to ENT; Future  -     CT Maxillofacial W WO Contrast; Future    Other orders  -     sulfamethoxazole-trimethoprim 800-160mg (BACTRIM DS) 800-160 mg Tab; Take 1 tablet by mouth 2 (two) times daily.

## 2022-04-04 NOTE — PATIENT INSTRUCTIONS
Juan C was seen today for raised area left neck.    Diagnoses and all orders for this visit:    Parotitis  -     CBC Auto Differential; Future  -     X-Ray Facial Bones  3 Or More View; Future  -     CBC Auto Differential  -     Ambulatory referral/consult to ENT; Future  -     CT Maxillofacial W WO Contrast; Future    Other orders  -     sulfamethoxazole-trimethoprim 800-160mg (BACTRIM DS) 800-160 mg Tab; Take 1 tablet by mouth 2 (two) times daily.

## 2022-04-05 ENCOUNTER — OFFICE VISIT (OUTPATIENT)
Dept: FAMILY MEDICINE | Facility: CLINIC | Age: 57
End: 2022-04-05

## 2022-04-05 VITALS
HEIGHT: 72 IN | WEIGHT: 283.81 LBS | BODY MASS INDEX: 38.44 KG/M2 | DIASTOLIC BLOOD PRESSURE: 73 MMHG | OXYGEN SATURATION: 96 % | TEMPERATURE: 98 F | RESPIRATION RATE: 18 BRPM | HEART RATE: 74 BPM | SYSTOLIC BLOOD PRESSURE: 109 MMHG

## 2022-04-05 DIAGNOSIS — K11.20 PAROTIDITIS: Primary | ICD-10-CM

## 2022-04-05 PROCEDURE — 99214 OFFICE O/P EST MOD 30 MIN: CPT | Mod: ,,, | Performed by: INTERNAL MEDICINE

## 2022-04-05 PROCEDURE — 99214 PR OFFICE/OUTPT VISIT, EST, LEVL IV, 30-39 MIN: ICD-10-PCS | Mod: ,,, | Performed by: INTERNAL MEDICINE

## 2022-04-05 RX ORDER — SULFAMETHOXAZOLE AND TRIMETHOPRIM 800; 160 MG/1; MG/1
1 TABLET ORAL 2 TIMES DAILY
Qty: 20 TABLET | Refills: 0 | Status: SHIPPED | OUTPATIENT
Start: 2022-04-05

## 2022-04-05 NOTE — PATIENT INSTRUCTIONS
Juan C was seen today for right side facial edema.    Diagnoses and all orders for this visit:    Parotiditis  -     Ambulatory referral/consult to ENT; Future    Other orders  The following orders have not been finalized:  -     sulfamethoxazole-trimethoprim 800-160mg (BACTRIM DS) 800-160 mg Tab

## 2022-04-05 NOTE — PROGRESS NOTES
Subjective:       Patient ID: Juan C Carranza is a 56 y.o. male.    Chief Complaint: right side facial edema (Patient states need to know if it would be lanced)    Patient is here today for a follow up evaluation. Patient has no new complaints. Patient states the knot is going down. Swelling has decreased on exam. Patient also states he is still applying the Bactrim ointment to the area 3 times a day. Patient still undergoing antibiotic treatment with Bactrim DS. Will extend Bactrim DS for 4 more days. Reviewed X-ray with patient. X-ray showed no signs of abnormality. Patient has not had CT done. Also has not yet followed with ENT. Will follow in 3 weeks.       Current Medications:    Current Outpatient Medications:     aspirin (ECOTRIN) 81 MG EC tablet, Take 81 mg by mouth once daily., Disp: , Rfl:     atorvastatin (LIPITOR) 20 MG tablet, Take 1 tablet (20 mg total) by mouth once daily., Disp: 90 tablet, Rfl: 1    baloxavir marboxiL (XOFLUZA) 40 mg tablet, Take 40 mg by mouth. TAKE 2 TABS DAILY, Disp: , Rfl:     carvediloL (COREG) 25 MG tablet, Take 1 tablet (25 mg total) by mouth 2 (two) times daily with meals., Disp: 180 tablet, Rfl: 1    cetirizine (ZYRTEC) 10 MG tablet, Take 10 mg by mouth once daily., Disp: , Rfl:     chlorthalidone (HYGROTEN) 25 MG Tab, Take 1 tablet (25 mg total) by mouth once daily., Disp: 90 tablet, Rfl: 1    cholecalciferol, vitamin D3, 1,250 mcg (50,000 unit) capsule, Take 50,000 Units by mouth once a week., Disp: , Rfl:     famotidine (PEPCID) 20 MG tablet, Take 20 mg by mouth 2 (two) times daily., Disp: , Rfl:     lisinopriL (PRINIVIL,ZESTRIL) 20 MG tablet, Take 1 tablet (20 mg total) by mouth once daily., Disp: 90 tablet, Rfl: 1    naproxen (NAPROSYN) 500 MG tablet, Take 1 tablet (500 mg total) by mouth 2 (two) times daily as needed., Disp: 20 tablet, Rfl: 0    naproxen (NAPROSYN) 500 MG tablet, Take 1 tablet (500 mg total) by mouth 2 (two) times daily as needed., Disp: 20  tablet, Rfl: 1    sildenafiL (VIAGRA) 100 MG tablet, Take 100 mg by mouth daily as needed for Erectile Dysfunction., Disp: , Rfl:     sulfamethoxazole-trimethoprim 800-160mg (BACTRIM DS) 800-160 mg Tab, Take 1 tablet by mouth 2 (two) times daily., Disp: 20 tablet, Rfl: 0    tamsulosin (FLOMAX) 0.4 mg Cap, Take 0.4 mg by mouth once daily., Disp: , Rfl:     Last Labs:     Office Visit on 04/01/2022   Component Date Value    WBC 04/01/2022 5.01     RBC 04/01/2022 5.35     Hemoglobin 04/01/2022 16.3     Hematocrit 04/01/2022 49.5     MCV 04/01/2022 92.5     MCH 04/01/2022 30.5     MCHC 04/01/2022 32.9     RDW 04/01/2022 13.6     Platelet Count 04/01/2022 218     MPV 04/01/2022 12.1     Neutrophils % 04/01/2022 52.3 (A)    Lymphocytes % 04/01/2022 31.9     Monocytes % 04/01/2022 13.4 (A)    Eosinophils % 04/01/2022 1.8     Basophils % 04/01/2022 0.4     Immature Granulocytes % 04/01/2022 0.2     nRBC, Auto 04/01/2022 0.0     Neutrophils, Abs 04/01/2022 2.62     Lymphocytes, Absolute 04/01/2022 1.60     Monocytes, Absolute 04/01/2022 0.67     Eosinophils, Absolute 04/01/2022 0.09     Basophils, Absolute 04/01/2022 0.02     Immature Granulocytes, A* 04/01/2022 0.01     nRBC, Absolute 04/01/2022 0.00     Diff Type 04/01/2022 Auto        Last Imaging:  X-Ray Facial Bones  3 Or More View  Narrative: EXAMINATION:  XR FACIAL BONES 3 OR MORE VIEW    CLINICAL HISTORY:  left side facial edema;  Sialoadenitis, unspecified    TECHNIQUE:  Four views of the facial bones were performed.    COMPARISON:  None    FINDINGS:  There is no fracture identified or radiopaque foreign body.  The soft tissues are not well evaluated by radiograph although no soft tissue calcification is detected.  Impression: As above    Electronically signed by: Luis E Sequeira  Date:    04/01/2022  Time:    11:46         Review of Systems   Integumentary:  Positive for mole/lesion.   All other systems reviewed and are negative.         Objective:      Physical Exam  Constitutional:       Appearance: Normal appearance. He is normal weight.   Cardiovascular:      Rate and Rhythm: Normal rate and regular rhythm.      Pulses: Normal pulses.      Heart sounds: Normal heart sounds.   Pulmonary:      Effort: Pulmonary effort is normal.      Breath sounds: Normal breath sounds.   Abdominal:      General: Abdomen is flat. Bowel sounds are normal.      Palpations: Abdomen is soft.   Musculoskeletal:         General: Normal range of motion.      Cervical back: Normal range of motion and neck supple.   Skin:     General: Skin is warm and dry.      Findings: Lesion present.   Neurological:      General: No focal deficit present.      Mental Status: He is alert and oriented to person, place, and time. Mental status is at baseline.         Assessment:       1. Parotiditis  Ambulatory referral/consult to ENT        Plan:         Juan C was seen today for right side facial edema.    Diagnoses and all orders for this visit:    Parotiditis  -     Ambulatory referral/consult to ENT; Future    Other orders  The following orders have not been finalized:  -     sulfamethoxazole-trimethoprim 800-160mg (BACTRIM DS) 800-160 mg Tab

## 2022-04-07 ENCOUNTER — OFFICE VISIT (OUTPATIENT)
Dept: OTOLARYNGOLOGY | Facility: CLINIC | Age: 57
End: 2022-04-07

## 2022-04-07 VITALS — WEIGHT: 283 LBS | HEIGHT: 72 IN | BODY MASS INDEX: 38.33 KG/M2

## 2022-04-07 DIAGNOSIS — K11.20 PAROTITIS: ICD-10-CM

## 2022-04-07 PROCEDURE — 99214 OFFICE O/P EST MOD 30 MIN: CPT | Mod: PBBFAC | Performed by: OTOLARYNGOLOGY

## 2022-04-07 PROCEDURE — 99204 OFFICE O/P NEW MOD 45 MIN: CPT | Mod: S$PBB,,, | Performed by: OTOLARYNGOLOGY

## 2022-04-07 PROCEDURE — 99204 PR OFFICE/OUTPT VISIT, NEW, LEVL IV, 45-59 MIN: ICD-10-PCS | Mod: S$PBB,,, | Performed by: OTOLARYNGOLOGY

## 2022-04-07 NOTE — PROGRESS NOTES
Subjective:       Patient ID: Juan C Carranza is a 56 y.o. male.    Chief Complaint: Neck Swelling (Patient is here for left Parotitis that started a week ago.)    Mr. Carranza presents to clinic with left facial swelling and pain x 1 week. Reports pain with chewing on left side. Reports use of antibiotics with swelling improvement.     Review of Systems   Constitutional: Negative for chills, fatigue and fever.   HENT: Positive for facial swelling. Negative for ear discharge and ear pain.          Objective:      Physical Exam  Constitutional:       Appearance: Normal appearance.   HENT:      Head: Normocephalic.      Right Ear: Tympanic membrane, ear canal and external ear normal.      Left Ear: Tympanic membrane, ear canal and external ear normal.      Nose: Nose normal.      Mouth/Throat:      Mouth: Mucous membranes are moist.      Pharynx: Oropharynx is clear.   Neck:     Pulmonary:      Effort: Pulmonary effort is normal.   Skin:     General: Skin is warm and dry.   Neurological:      Mental Status: He is alert.   Psychiatric:         Mood and Affect: Mood normal.         Behavior: Behavior is cooperative.         Assessment:       Problem List Items Addressed This Visit    None     Visit Diagnoses     Parotitis              Plan:   1. Complete antibiotic therapy and increase fluid intake   Follow up in 1 mos if not improved.   CT scan if not improved

## 2022-08-15 ENCOUNTER — TELEPHONE (OUTPATIENT)
Dept: FAMILY MEDICINE | Facility: CLINIC | Age: 57
End: 2022-08-15

## 2022-08-15 RX ORDER — CHLORTHALIDONE 25 MG/1
25 TABLET ORAL DAILY
Qty: 90 TABLET | Refills: 1 | Status: SHIPPED | OUTPATIENT
Start: 2022-08-15 | End: 2022-11-15

## 2022-08-15 NOTE — TELEPHONE ENCOUNTER
----- Message from Trev Diaz sent at 8/12/2022 12:30 PM CDT -----  Pt called and asked for a refill on this medicine       chlorthalidone (HYGROTEN) 25 MG Tab      Call back number [phone]

## 2022-08-15 NOTE — TELEPHONE ENCOUNTER
Call returned to pt about refill request. Pt is due for a f/u appt. Last refill for chlorthalidone was sent on 2/11/2022 was for a 6month supply. Pt needs to schedule appt and he should have enough medication for the rest of this month.     No answer. Voicemail full.

## 2022-08-25 RX ORDER — ATORVASTATIN CALCIUM 20 MG/1
20 TABLET, FILM COATED ORAL DAILY
Qty: 90 TABLET | Refills: 1 | Status: SHIPPED | OUTPATIENT
Start: 2022-08-25 | End: 2023-11-02 | Stop reason: SDUPTHER

## 2022-09-27 ENCOUNTER — OFFICE VISIT (OUTPATIENT)
Dept: FAMILY MEDICINE | Facility: CLINIC | Age: 57
End: 2022-09-27

## 2022-09-27 ENCOUNTER — HOSPITAL ENCOUNTER (EMERGENCY)
Facility: HOSPITAL | Age: 57
Discharge: HOME | End: 2022-09-27
Attending: EMERGENCY MEDICINE

## 2022-09-27 VITALS
HEIGHT: 72 IN | OXYGEN SATURATION: 98 % | BODY MASS INDEX: 39.77 KG/M2 | RESPIRATION RATE: 20 BRPM | DIASTOLIC BLOOD PRESSURE: 78 MMHG | WEIGHT: 293.63 LBS | HEART RATE: 86 BPM | SYSTOLIC BLOOD PRESSURE: 128 MMHG

## 2022-09-27 VITALS
TEMPERATURE: 98 F | DIASTOLIC BLOOD PRESSURE: 97 MMHG | OXYGEN SATURATION: 96 % | SYSTOLIC BLOOD PRESSURE: 138 MMHG | WEIGHT: 293 LBS | HEART RATE: 70 BPM | BODY MASS INDEX: 39.68 KG/M2 | HEIGHT: 72 IN | RESPIRATION RATE: 16 BRPM

## 2022-09-27 DIAGNOSIS — N52.9 ERECTILE DYSFUNCTION, UNSPECIFIED ERECTILE DYSFUNCTION TYPE: ICD-10-CM

## 2022-09-27 DIAGNOSIS — M54.50 ACUTE LEFT-SIDED LOW BACK PAIN WITHOUT SCIATICA: ICD-10-CM

## 2022-09-27 DIAGNOSIS — R06.02 SHORTNESS OF BREATH: ICD-10-CM

## 2022-09-27 DIAGNOSIS — L98.7 EXCESS SKIN OF BREAST: ICD-10-CM

## 2022-09-27 DIAGNOSIS — Z13.1 SCREENING FOR DIABETES MELLITUS: ICD-10-CM

## 2022-09-27 DIAGNOSIS — R07.9 CHEST PAIN, UNSPECIFIED TYPE: ICD-10-CM

## 2022-09-27 DIAGNOSIS — R07.89 ATYPICAL CHEST PAIN: Primary | ICD-10-CM

## 2022-09-27 DIAGNOSIS — R10.9 ACUTE LEFT FLANK PAIN: Primary | ICD-10-CM

## 2022-09-27 DIAGNOSIS — M54.9 DORSALGIA, UNSPECIFIED: ICD-10-CM

## 2022-09-27 DIAGNOSIS — R10.9 FLANK PAIN: ICD-10-CM

## 2022-09-27 DIAGNOSIS — G47.33 OSA (OBSTRUCTIVE SLEEP APNEA): ICD-10-CM

## 2022-09-27 LAB
ALBUMIN SERPL BCP-MCNC: 3.7 G/DL (ref 3.5–5)
ALP SERPL-CCNC: 85 U/L (ref 45–115)
ALT SERPL W P-5'-P-CCNC: 129 U/L (ref 16–61)
ANION GAP SERPL CALCULATED.3IONS-SCNC: 7 MMOL/L (ref 7–16)
ANION GAP SERPL CALCULATED.3IONS-SCNC: 9 MMOL/L (ref 7–16)
AST SERPL W P-5'-P-CCNC: 88 U/L (ref 15–37)
BASOPHILS # BLD AUTO: 0.02 K/UL (ref 0–0.2)
BASOPHILS # BLD AUTO: 0.04 K/UL (ref 0–0.2)
BASOPHILS NFR BLD AUTO: 0.5 % (ref 0–1)
BASOPHILS NFR BLD AUTO: 0.8 % (ref 0–1)
BILIRUB DIRECT SERPL-MCNC: 0.2 MG/DL (ref 0–0.2)
BILIRUB SERPL-MCNC: 0.6 MG/DL (ref ?–1.2)
BILIRUB UR QL STRIP: NEGATIVE
BUN SERPL-MCNC: 14 MG/DL (ref 7–18)
BUN SERPL-MCNC: 16 MG/DL (ref 7–18)
BUN/CREAT SERPL: 15 (ref 6–20)
BUN/CREAT SERPL: 19 (ref 6–20)
CALCIUM SERPL-MCNC: 9.3 MG/DL (ref 8.5–10.1)
CALCIUM SERPL-MCNC: 9.4 MG/DL (ref 8.5–10.1)
CHLORIDE SERPL-SCNC: 104 MMOL/L (ref 98–107)
CHLORIDE SERPL-SCNC: 104 MMOL/L (ref 98–107)
CLARITY UR: CLEAR
CO2 SERPL-SCNC: 29 MMOL/L (ref 21–32)
CO2 SERPL-SCNC: 31 MMOL/L (ref 21–32)
COLOR UR: ABNORMAL
CREAT SERPL-MCNC: 0.84 MG/DL (ref 0.7–1.3)
CREAT SERPL-MCNC: 0.91 MG/DL (ref 0.7–1.3)
CREAT UR-MCNC: 159 MG/DL (ref 39–259)
DIFFERENTIAL METHOD BLD: ABNORMAL
DIFFERENTIAL METHOD BLD: ABNORMAL
EGFR (NO RACE VARIABLE) (RUSH/TITUS): 101 ML/MIN/1.73M²
EGFR (NO RACE VARIABLE) (RUSH/TITUS): 98 ML/MIN/1.73M²
EOSINOPHIL # BLD AUTO: 0.22 K/UL (ref 0–0.5)
EOSINOPHIL # BLD AUTO: 0.23 K/UL (ref 0–0.5)
EOSINOPHIL NFR BLD AUTO: 4.7 % (ref 1–4)
EOSINOPHIL NFR BLD AUTO: 5 % (ref 1–4)
ERYTHROCYTE [DISTWIDTH] IN BLOOD BY AUTOMATED COUNT: 13.6 % (ref 11.5–14.5)
ERYTHROCYTE [DISTWIDTH] IN BLOOD BY AUTOMATED COUNT: 13.6 % (ref 11.5–14.5)
GLUCOSE SERPL-MCNC: 84 MG/DL (ref 74–106)
GLUCOSE SERPL-MCNC: 98 MG/DL (ref 74–106)
GLUCOSE UR STRIP-MCNC: NORMAL MG/DL
HCT VFR BLD AUTO: 46.7 % (ref 40–54)
HCT VFR BLD AUTO: 48.1 % (ref 40–54)
HGB BLD-MCNC: 15.9 G/DL (ref 13.5–18)
HGB BLD-MCNC: 16.6 G/DL (ref 13.5–18)
IMM GRANULOCYTES # BLD AUTO: 0.01 K/UL (ref 0–0.04)
IMM GRANULOCYTES # BLD AUTO: 0.02 K/UL (ref 0–0.04)
IMM GRANULOCYTES NFR BLD: 0.2 % (ref 0–0.4)
IMM GRANULOCYTES NFR BLD: 0.4 % (ref 0–0.4)
KETONES UR STRIP-SCNC: NEGATIVE MG/DL
LEUKOCYTE ESTERASE UR QL STRIP: NEGATIVE
LYMPHOCYTES # BLD AUTO: 1.6 K/UL (ref 1–4.8)
LYMPHOCYTES # BLD AUTO: 1.74 K/UL (ref 1–4.8)
LYMPHOCYTES NFR BLD AUTO: 35.4 % (ref 27–41)
LYMPHOCYTES NFR BLD AUTO: 36.4 % (ref 27–41)
MCH RBC QN AUTO: 31.6 PG (ref 27–31)
MCH RBC QN AUTO: 31.9 PG (ref 27–31)
MCHC RBC AUTO-ENTMCNC: 34 G/DL (ref 32–36)
MCHC RBC AUTO-ENTMCNC: 34.5 G/DL (ref 32–36)
MCV RBC AUTO: 92.3 FL (ref 80–96)
MCV RBC AUTO: 92.8 FL (ref 80–96)
MICROALBUMIN UR-MCNC: 0.8 MG/DL (ref 0–2.8)
MICROALBUMIN/CREAT RATIO PNL UR: 5 MG/G (ref 0–30)
MONOCYTES # BLD AUTO: 0.6 K/UL (ref 0–0.8)
MONOCYTES # BLD AUTO: 0.75 K/UL (ref 0–0.8)
MONOCYTES NFR BLD AUTO: 13.6 % (ref 2–6)
MONOCYTES NFR BLD AUTO: 15.2 % (ref 2–6)
MPC BLD CALC-MCNC: 11.7 FL (ref 9.4–12.4)
MPC BLD CALC-MCNC: 12.2 FL (ref 9.4–12.4)
NEUTROPHILS # BLD AUTO: 1.95 K/UL (ref 1.8–7.7)
NEUTROPHILS # BLD AUTO: 2.14 K/UL (ref 1.8–7.7)
NEUTROPHILS NFR BLD AUTO: 43.5 % (ref 53–65)
NEUTROPHILS NFR BLD AUTO: 44.3 % (ref 53–65)
NITRITE UR QL STRIP: NEGATIVE
NRBC # BLD AUTO: 0 X10E3/UL
NRBC # BLD AUTO: 0 X10E3/UL
NRBC, AUTO (.00): 0 %
NRBC, AUTO (.00): 0 %
PH UR STRIP: 7.5 PH UNITS
PLATELET # BLD AUTO: 211 K/UL (ref 150–400)
PLATELET # BLD AUTO: 218 K/UL (ref 150–400)
POTASSIUM SERPL-SCNC: 3.2 MMOL/L (ref 3.5–5.1)
POTASSIUM SERPL-SCNC: 3.4 MMOL/L (ref 3.5–5.1)
PROT SERPL-MCNC: 8.5 G/DL (ref 6.4–8.2)
PROT UR QL STRIP: NEGATIVE
PROT UR-MCNC: 14 MG/DL (ref 0–11.9)
RBC # BLD AUTO: 5.03 M/UL (ref 4.6–6.2)
RBC # BLD AUTO: 5.21 M/UL (ref 4.6–6.2)
RBC # UR STRIP: NEGATIVE /UL
SODIUM SERPL-SCNC: 139 MMOL/L (ref 136–145)
SODIUM SERPL-SCNC: 139 MMOL/L (ref 136–145)
SP GR UR STRIP: 1.02
TROPONIN I SERPL HS-MCNC: 6.5 PG/ML
UROBILINOGEN UR STRIP-ACNC: 2 MG/DL
WBC # BLD AUTO: 4.4 K/UL (ref 4.5–11)
WBC # BLD AUTO: 4.92 K/UL (ref 4.5–11)

## 2022-09-27 PROCEDURE — 80076 HEPATIC FUNCTION PANEL: ICD-10-PCS | Mod: ,,, | Performed by: CLINICAL MEDICAL LABORATORY

## 2022-09-27 PROCEDURE — 99283 EMERGENCY DEPT VISIT LOW MDM: CPT

## 2022-09-27 PROCEDURE — 81003 URINALYSIS AUTO W/O SCOPE: CPT | Mod: QW,,, | Performed by: CLINICAL MEDICAL LABORATORY

## 2022-09-27 PROCEDURE — 84156 PROTEIN, QUANTITATIVE, URINE RANDOM: ICD-10-PCS | Mod: ,,, | Performed by: CLINICAL MEDICAL LABORATORY

## 2022-09-27 PROCEDURE — 99214 PR OFFICE/OUTPT VISIT, EST, LEVL IV, 30-39 MIN: ICD-10-PCS | Mod: 25,,, | Performed by: INTERNAL MEDICINE

## 2022-09-27 PROCEDURE — 80076 HEPATIC FUNCTION PANEL: CPT | Mod: ,,, | Performed by: CLINICAL MEDICAL LABORATORY

## 2022-09-27 PROCEDURE — 82043 MICROALBUMIN / CREATININE RATIO URINE: ICD-10-PCS | Mod: ,,, | Performed by: CLINICAL MEDICAL LABORATORY

## 2022-09-27 PROCEDURE — 82043 UR ALBUMIN QUANTITATIVE: CPT | Mod: ,,, | Performed by: CLINICAL MEDICAL LABORATORY

## 2022-09-27 PROCEDURE — 80048 BASIC METABOLIC PNL TOTAL CA: CPT | Mod: ,,, | Performed by: CLINICAL MEDICAL LABORATORY

## 2022-09-27 PROCEDURE — 99282 PR EMERGENCY DEPT VISIT,LEVEL II: ICD-10-PCS | Mod: ,,, | Performed by: EMERGENCY MEDICINE

## 2022-09-27 PROCEDURE — 81003 URINALYSIS, REFLEX TO URINE CULTURE: ICD-10-PCS | Mod: QW,,, | Performed by: CLINICAL MEDICAL LABORATORY

## 2022-09-27 PROCEDURE — 36415 COLL VENOUS BLD VENIPUNCTURE: CPT | Performed by: EMERGENCY MEDICINE

## 2022-09-27 PROCEDURE — 85025 COMPLETE CBC W/AUTO DIFF WBC: CPT | Performed by: EMERGENCY MEDICINE

## 2022-09-27 PROCEDURE — 93000 PR ELECTROCARDIOGRAM, COMPLETE: ICD-10-PCS | Mod: ,,, | Performed by: INTERNAL MEDICINE

## 2022-09-27 PROCEDURE — 85025 CBC WITH DIFFERENTIAL: ICD-10-PCS | Mod: ,,, | Performed by: CLINICAL MEDICAL LABORATORY

## 2022-09-27 PROCEDURE — 80048 BASIC METABOLIC PANEL: ICD-10-PCS | Mod: ,,, | Performed by: CLINICAL MEDICAL LABORATORY

## 2022-09-27 PROCEDURE — 82570 ASSAY OF URINE CREATININE: CPT | Mod: ,,, | Performed by: CLINICAL MEDICAL LABORATORY

## 2022-09-27 PROCEDURE — 80048 BASIC METABOLIC PNL TOTAL CA: CPT | Performed by: EMERGENCY MEDICINE

## 2022-09-27 PROCEDURE — 83036 HEMOGLOBIN A1C: ICD-10-PCS | Mod: ,,, | Performed by: CLINICAL MEDICAL LABORATORY

## 2022-09-27 PROCEDURE — 84484 ASSAY OF TROPONIN QUANT: CPT | Performed by: EMERGENCY MEDICINE

## 2022-09-27 PROCEDURE — 93000 ELECTROCARDIOGRAM COMPLETE: CPT | Mod: ,,, | Performed by: INTERNAL MEDICINE

## 2022-09-27 PROCEDURE — 85025 COMPLETE CBC W/AUTO DIFF WBC: CPT | Mod: ,,, | Performed by: CLINICAL MEDICAL LABORATORY

## 2022-09-27 PROCEDURE — 99282 EMERGENCY DEPT VISIT SF MDM: CPT | Mod: ,,, | Performed by: EMERGENCY MEDICINE

## 2022-09-27 PROCEDURE — 99214 OFFICE O/P EST MOD 30 MIN: CPT | Mod: 25,,, | Performed by: INTERNAL MEDICINE

## 2022-09-27 PROCEDURE — 83036 HEMOGLOBIN GLYCOSYLATED A1C: CPT | Mod: ,,, | Performed by: CLINICAL MEDICAL LABORATORY

## 2022-09-27 PROCEDURE — 84156 ASSAY OF PROTEIN URINE: CPT | Mod: ,,, | Performed by: CLINICAL MEDICAL LABORATORY

## 2022-09-27 PROCEDURE — 82570 MICROALBUMIN / CREATININE RATIO URINE: ICD-10-PCS | Mod: ,,, | Performed by: CLINICAL MEDICAL LABORATORY

## 2022-09-27 NOTE — ED PROVIDER NOTES
Encounter Date: 9/27/2022       History     Chief Complaint   Patient presents with    Chest Pain     56 Y/O MALE PATIENT SENT FROM DR [facility] FOR AN ABNORMAL ECG.  ECG HERE IN THE ED IS NORMAL.  PATIENT SAYS HE DOES OCCASIONALLY HAVE SHARP CHEST PAINS.  HE DENIES ANY SHORTNESS OF BREATH OR REMITTING OR EXACERBATING FACTORS.      Review of patient's allergies indicates:  No Known Allergies  Past Medical History:   Diagnosis Date    Cardiomegaly     Hyperlipidemia     Hypertension     Palpitations      History reviewed. No pertinent surgical history.  Family History   Family history unknown: Yes     Social History     Tobacco Use    Smoking status: Never    Smokeless tobacco: Never   Substance Use Topics    Alcohol use: Yes     Review of Systems   All other systems reviewed and are negative.    Physical Exam     Initial Vitals [09/27/22 1713]   BP Pulse Resp Temp SpO2   (!) 146/102 67 16 98.1 °F (36.7 °C) 97 %      MAP       --         Physical Exam    Nursing note and vitals reviewed.  Constitutional: He appears well-developed and well-nourished.   HENT:   Head: Normocephalic and atraumatic.   Nose: Nose normal.   Mouth/Throat: Oropharynx is clear and moist.   Eyes: Conjunctivae and EOM are normal. Pupils are equal, round, and reactive to light.   Neck: Neck supple.   Normal range of motion.  Cardiovascular:  Normal rate, regular rhythm and normal heart sounds.           Pulmonary/Chest: Breath sounds normal.   Abdominal: Abdomen is soft. Bowel sounds are normal.   Musculoskeletal:         General: Normal range of motion.      Cervical back: Normal range of motion and neck supple.     Neurological: He is alert and oriented to person, place, and time. He has normal strength. GCS score is 15. GCS eye subscore is 4. GCS verbal subscore is 5. GCS motor subscore is 6.   Skin: Skin is warm and dry. Capillary refill takes less than 2 seconds.   Psychiatric: He has a normal mood and affect. Thought content normal.        Medical Screening Exam   See Full Note    ED Course   Procedures  Labs Reviewed   BASIC METABOLIC PANEL - Abnormal; Notable for the following components:       Result Value    Potassium 3.4 (*)     All other components within normal limits   CBC WITH DIFFERENTIAL - Abnormal; Notable for the following components:    WBC 4.40 (*)     MCH 31.6 (*)     Neutrophils % 44.3 (*)     Monocytes % 13.6 (*)     Eosinophils % 5.0 (*)     All other components within normal limits   TROPONIN I - Normal   CBC W/ AUTO DIFFERENTIAL    Narrative:     The following orders were created for panel order CBC auto differential.  Procedure                               Abnormality         Status                     ---------                               -----------         ------                     CBC with Differential[[id number]]        Abnormal            Final result                 Please view results for these tests on the individual orders.          Imaging Results    None          Medications - No data to display              ED Course as of 09/29/22 0251   Tue Sep 27, 2022   1926 Troponin I High Sensitivity: 6.5 [BB]   1926 Potassium(!): 3.4 [BB]   1926 Glucose: 98 [BB]   1926 BUN: 16 [BB]   1926 Creatinine: 0.84 [BB]   1926 WBC(!): 4.40 [BB]   1926 Hemoglobin: 15.9 [BB]   1926 Hematocrit: 46.7 [BB]   1926 Platelets: 211 [BB]   1929 On my examination patient describes pains is sharp and intermittent only lasting for a few seconds as a time with no other associated symptoms.  Pain does not sound cardiac, EKG was reviewed and is normal.  Troponin is normal.  I feel this is very unlikely to be cardiac. [BB]      ED Course User Index  [BB] Josue De La Cruz MD          Clinical Impression:   Final diagnoses:  [R07.89] Atypical chest pain (Primary)      ED Disposition Condition    Discharge Stable          ED Prescriptions    None       Follow-up Information    None          Josue De La Cruz MD  09/29/22 0251

## 2022-09-27 NOTE — PROGRESS NOTES
Pt referred [facility] for abnormal EKG. Pt is asymptomatic with no chest pain or sob at this time. He is alert,ambulatory, and oriented x4. His will go to [facility] via pov. Report was called to ED and given to ALBERTO Lama. EKG has been scanned into pt's chart.

## 2022-09-27 NOTE — ED TRIAGE NOTES
Pt presents to ed with c/o having chest pain on and off for over a week and today Dr. Martinez ran some test on him and then told him to come to the ER

## 2022-09-28 ENCOUNTER — TELEPHONE (OUTPATIENT)
Dept: EMERGENCY MEDICINE | Facility: HOSPITAL | Age: 57
End: 2022-09-28

## 2022-09-28 LAB
EST. AVERAGE GLUCOSE BLD GHB EST-MCNC: 110 MG/DL
HBA1C MFR BLD HPLC: 5.9 % (ref 4.5–6.6)

## 2022-09-28 RX ORDER — SILDENAFIL 100 MG/1
100 TABLET, FILM COATED ORAL DAILY PRN
Qty: 12 TABLET | Refills: 2 | Status: SHIPPED | OUTPATIENT
Start: 2022-09-28

## 2022-09-28 NOTE — DISCHARGE INSTRUCTIONS
Continue home medications as prescribed.  Follow up in clinic with primary care provider in 2-3 days for recheck.  Return to emergency department for any worsening or further problems.

## 2022-09-28 NOTE — PATIENT INSTRUCTIONS
Juan C was seen today for flank pain.    Diagnoses and all orders for this visit:    Acute left flank pain  -     Basic Metabolic Panel; Future  -     CBC Auto Differential; Future  -     Hepatic Function Panel; Future  -     Urinalysis, Reflex to Urine Culture; Future  -     Protein, Random Urine; Future  -     Microalbumin/Creatinine Ratio, Urine; Future  -     X-Ray KUB; Future  -     Basic Metabolic Panel  -     CBC Auto Differential  -     Hepatic Function Panel  -     Urinalysis, Reflex to Urine Culture  -     Protein, Random Urine  -     Microalbumin/Creatinine Ratio, Urine  -     US Retroperitoneal Complete; Future    Acute left-sided low back pain without sciatica    Erectile dysfunction, unspecified erectile dysfunction type    HERIBERTO (obstructive sleep apnea)  -     Ambulatory referral/consult to Sleep Disorders; Future    Chest pain, unspecified type  -     POCT EKG 12-LEAD (NOT FOR [facility] USE)  -     Exercise Stress - EKG; Future  -     Refer to Emergency Dept.    Shortness of breath    Screening for diabetes mellitus  -     Hemoglobin A1C; Future  -     Hemoglobin A1C    Dorsalgia, unspecified  -     X-Ray Lumbar Spine Ap And Lateral; Future    Flank pain  -     X-Ray Ribs 3 Views Bilateral; Future  -     US Retroperitoneal Complete; Future    Excess skin of breast  -     US Breast Left Complete; Future    Other orders  -     sildenafiL (VIAGRA) 100 MG tablet; Take 1 tablet (100 mg total) by mouth daily as needed for Erectile Dysfunction.

## 2022-09-28 NOTE — PROGRESS NOTES
Subjective:       Patient ID: Juan C Carranza is a 57 y.o. male.    Chief Complaint: Flank Pain (Left side pain)    Patient is here today for check up evaluation. Patient states he is still having left sided flank pain. States feels he has more fat on left side near axilla. States the pain is more on his left back side and feels like needles. Denies pain on palpation. Will xray both sides, KUB, and L spine. Will also order renal US of left kidney/ureter/bladder. Requesting LFT and BMP. Will order all other labs as well. Patient reports his wife is concerned the fat near his axilla is a problem with his heart. Appears to be possible fatty pad/gynecomastia. Will order US of left breast and stress test. Patient states he does snore at night and has night sweats. Will order sleep study. Will follow in 3 weeks.     Current Medications:    Current Outpatient Medications:     aspirin (ECOTRIN) 81 MG EC tablet, Take 81 mg by mouth once daily., Disp: , Rfl:     atorvastatin (LIPITOR) 20 MG tablet, Take 1 tablet (20 mg total) by mouth once daily., Disp: 90 tablet, Rfl: 1    carvediloL (COREG) 25 MG tablet, Take 1 tablet (25 mg total) by mouth 2 (two) times daily with meals., Disp: 180 tablet, Rfl: 1    chlorthalidone (HYGROTEN) 25 MG Tab, Take 1 tablet (25 mg total) by mouth once daily., Disp: 90 tablet, Rfl: 1    baloxavir marboxiL (XOFLUZA) 40 mg tablet, Take 40 mg by mouth. TAKE 2 TABS DAILY, Disp: , Rfl:     cetirizine (ZYRTEC) 10 MG tablet, Take 10 mg by mouth once daily., Disp: , Rfl:     cholecalciferol, vitamin D3, 1,250 mcg (50,000 unit) capsule, Take 50,000 Units by mouth once a week., Disp: , Rfl:     famotidine (PEPCID) 20 MG tablet, Take 20 mg by mouth 2 (two) times daily., Disp: , Rfl:     lisinopriL (PRINIVIL,ZESTRIL) 20 MG tablet, Take 1 tablet (20 mg total) by mouth once daily. (Patient not taking: Reported on 9/27/2022), Disp: 90 tablet, Rfl: 1    naproxen (NAPROSYN) 500 MG tablet, Take 1 tablet (500  mg total) by mouth 2 (two) times daily as needed. (Patient not taking: Reported on 9/27/2022), Disp: 20 tablet, Rfl: 0    naproxen (NAPROSYN) 500 MG tablet, Take 1 tablet (500 mg total) by mouth 2 (two) times daily as needed. (Patient not taking: Reported on 9/27/2022), Disp: 20 tablet, Rfl: 1    sildenafiL (VIAGRA) 100 MG tablet, Take 1 tablet (100 mg total) by mouth daily as needed for Erectile Dysfunction., Disp: 12 tablet, Rfl: 2    sulfamethoxazole-trimethoprim 800-160mg (BACTRIM DS) 800-160 mg Tab, Take 1 tablet by mouth 2 (two) times daily. (Patient not taking: Reported on 9/27/2022), Disp: 20 tablet, Rfl: 0    tamsulosin (FLOMAX) 0.4 mg Cap, Take 0.4 mg by mouth once daily., Disp: , Rfl:     Last Labs:     Admission on 09/27/2022, Discharged on 09/27/2022   Component Date Value    Sodium 09/27/2022 139     Potassium 09/27/2022 3.4 (L)     Chloride 09/27/2022 104     CO2 09/27/2022 29     Anion Gap 09/27/2022 9     Glucose 09/27/2022 98     BUN 09/27/2022 16     Creatinine 09/27/2022 0.84     BUN/Creatinine Ratio 09/27/2022 19     Calcium 09/27/2022 9.3     eGFR 09/27/2022 101     Troponin I High Sensitiv* 09/27/2022 6.5     WBC 09/27/2022 4.40 (L)     RBC 09/27/2022 5.03     Hemoglobin 09/27/2022 15.9     Hematocrit 09/27/2022 46.7     MCV 09/27/2022 92.8     MCH 09/27/2022 31.6 (H)     MCHC 09/27/2022 34.0     RDW 09/27/2022 13.6     Platelet Count 09/27/2022 211     MPV 09/27/2022 11.7     Neutrophils % 09/27/2022 44.3 (L)     Lymphocytes % 09/27/2022 36.4     Monocytes % 09/27/2022 13.6 (H)     Eosinophils % 09/27/2022 5.0 (H)     Basophils % 09/27/2022 0.5     Immature Granulocytes % 09/27/2022 0.2     nRBC, Auto 09/27/2022 0.0     Neutrophils, Abs 09/27/2022 1.95     Lymphocytes, Absolute 09/27/2022 1.60     Monocytes, Absolute 09/27/2022 0.60     Eosinophils, Absolute 09/27/2022 0.22     Basophils, Absolute 09/27/2022 0.02     Immature Granulocytes, A* 09/27/2022  0.01     nRBC, Absolute 09/27/2022 0.00     Diff Type 09/27/2022 Auto    Office Visit on 09/27/2022   Component Date Value    Sodium 09/27/2022 139     Potassium 09/27/2022 3.2 (L)     Chloride 09/27/2022 104     CO2 09/27/2022 31     Anion Gap 09/27/2022 7     Glucose 09/27/2022 84     BUN 09/27/2022 14     Creatinine 09/27/2022 0.91     BUN/Creatinine Ratio 09/27/2022 15     Calcium 09/27/2022 9.4     eGFR 09/27/2022 98     Hemoglobin A1C 09/27/2022 5.9     Estimated Average Glucose 09/27/2022 110     Total Protein 09/27/2022 8.5 (H)     Albumin 09/27/2022 3.7     Bilirubin, Total 09/27/2022 0.6     Bilirubin, Direct 09/27/2022 0.2     AST 09/27/2022 88 (H)     ALT 09/27/2022 129 (H)     Alk Phos 09/27/2022 85     Color, UA 09/27/2022 Light-Yellow     Clarity, UA 09/27/2022 Clear     pH, UA 09/27/2022 7.5     Leukocytes, UA 09/27/2022 Negative     Nitrites, UA 09/27/2022 Negative     Protein, UA 09/27/2022 Negative     Glucose, UA 09/27/2022 Normal     Ketones, UA 09/27/2022 Negative     Urobilinogen, UA 09/27/2022 2 (A)     Bilirubin, UA 09/27/2022 Negative     Blood, UA 09/27/2022 Negative     Specific Gravity, UA 09/27/2022 1.022     Protein, Urine 09/27/2022 14.0 (H)     Creatinine, Urine 09/27/2022 159     Microalbumin 09/27/2022 0.8     Microalbumin/Creatinine * 09/27/2022 5.0     WBC 09/27/2022 4.92     RBC 09/27/2022 5.21     Hemoglobin 09/27/2022 16.6     Hematocrit 09/27/2022 48.1     MCV 09/27/2022 92.3     MCH 09/27/2022 31.9 (H)     MCHC 09/27/2022 34.5     RDW 09/27/2022 13.6     Platelet Count 09/27/2022 218     MPV 09/27/2022 12.2     Neutrophils % 09/27/2022 43.5 (L)     Lymphocytes % 09/27/2022 35.4     Monocytes % 09/27/2022 15.2 (H)     Eosinophils % 09/27/2022 4.7 (H)     Basophils % 09/27/2022 0.8     Immature Granulocytes % 09/27/2022 0.4     nRBC, Auto 09/27/2022 0.0     Neutrophils, Abs 09/27/2022 2.14     Lymphocytes, Absolute  09/27/2022 1.74     Monocytes, Absolute 09/27/2022 0.75     Eosinophils, Absolute 09/27/2022 0.23     Basophils, Absolute 09/27/2022 0.04     Immature Granulocytes, A* 09/27/2022 0.02     nRBC, Absolute 09/27/2022 0.00     Diff Type 09/27/2022 Auto        Last Imaging:  X-Ray Ribs 3 Views Bilateral  Narrative: EXAMINATION:  XR RIBS 3 VIEWS BILATERAL    CLINICAL HISTORY:  Unspecified abdominal pain    TECHNIQUE:  AP and oblique views of the right and left ribs.  Total of 7 images.    COMPARISON:  Chest radiograph 09/04/2018    FINDINGS:  No fracture is identified.  Lungs are clear.  No pneumothorax.  Impression: No acute findings.    Electronically signed by: Luis E Sequeira  Date:    09/27/2022  Time:    16:47  X-Ray KUB  Narrative: EXAMINATION:  XR KUB    CLINICAL HISTORY:  Unspecified abdominal pain    TECHNIQUE:  Single AP supine view of the abdomen (KUB) was performed    COMPARISON:  None    FINDINGS:  Bowel gas pattern is nonobstructed.  Mild colonic stool.  No abnormal calcifications.  Degenerative changes of the spine and hips.  Impression: Mild colonic stool.    Electronically signed by: Luis E Sequeira  Date:    09/27/2022  Time:    16:46  X-Ray Lumbar Spine Ap And Lateral  Narrative: EXAMINATION:  XR LUMBAR SPINE AP AND LATERAL    CLINICAL HISTORY:  Low back pain, progressive neurologic deficit;Dorsalgia, unspecified    TECHNIQUE:  AP, lateral and spot images were performed of the lumbar spine.    COMPARISON:  None    FINDINGS:  There are degenerative endplate and facet changes seen throughout the lumbar spine most notably at L4-5 and L5-S1.  There is mild degree of chronic appearing height loss seen of the L3 vertebral body.  No acute fracture detected.  Impression: Degenerative changes.    Electronically signed by: Luis E Sequeira  Date:    09/27/2022  Time:    16:45         Review of Systems   All other systems reviewed and are negative.      Objective:      Physical Exam  Vitals reviewed.   Constitutional:        Appearance: Normal appearance.   Cardiovascular:      Rate and Rhythm: Normal rate and regular rhythm.      Pulses: Normal pulses.      Heart sounds: Normal heart sounds.   Pulmonary:      Effort: Pulmonary effort is normal.      Breath sounds: Normal breath sounds.   Abdominal:      General: Abdomen is flat. Bowel sounds are normal.      Palpations: Abdomen is soft.   Musculoskeletal:         General: Normal range of motion.      Cervical back: Normal range of motion and neck supple.   Skin:     General: Skin is warm and dry.   Neurological:      General: No focal deficit present.      Mental Status: He is alert and oriented to person, place, and time. Mental status is at baseline.       Assessment:       1. Acute left flank pain  Basic Metabolic Panel    CBC Auto Differential    Hepatic Function Panel    Urinalysis, Reflex to Urine Culture    Protein, Random Urine    Microalbumin/Creatinine Ratio, Urine    X-Ray KUB    Basic Metabolic Panel    CBC Auto Differential    Hepatic Function Panel    Urinalysis, Reflex to Urine Culture    Protein, Random Urine    Microalbumin/Creatinine Ratio, Urine    US Retroperitoneal Complete      2. Acute left-sided low back pain without sciatica        3. Erectile dysfunction, unspecified erectile dysfunction type        4. HERIBERTO (obstructive sleep apnea)  Ambulatory referral/consult to Sleep Disorders      5. Chest pain, unspecified type  POCT EKG 12-LEAD (NOT FOR [facility] USE)    Exercise Stress - EKG    Refer to Emergency Dept.      6. Shortness of breath        7. Screening for diabetes mellitus  Hemoglobin A1C    Hemoglobin A1C      8. Dorsalgia, unspecified  X-Ray Lumbar Spine Ap And Lateral      9. Flank pain  X-Ray Ribs 3 Views Bilateral    US Retroperitoneal Complete      10. Excess skin of breast  US Breast Left Complete           Plan:         Juan C was seen today for flank pain.    Diagnoses and all orders for this visit:    Acute left flank pain  -     Basic Metabolic  Panel; Future  -     CBC Auto Differential; Future  -     Hepatic Function Panel; Future  -     Urinalysis, Reflex to Urine Culture; Future  -     Protein, Random Urine; Future  -     Microalbumin/Creatinine Ratio, Urine; Future  -     X-Ray KUB; Future  -     Basic Metabolic Panel  -     CBC Auto Differential  -     Hepatic Function Panel  -     Urinalysis, Reflex to Urine Culture  -     Protein, Random Urine  -     Microalbumin/Creatinine Ratio, Urine  -     US Retroperitoneal Complete; Future    Acute left-sided low back pain without sciatica    Erectile dysfunction, unspecified erectile dysfunction type    HERIBERTO (obstructive sleep apnea)  -     Ambulatory referral/consult to Sleep Disorders; Future    Chest pain, unspecified type  -     POCT EKG 12-LEAD (NOT FOR [facility] USE)  -     Exercise Stress - EKG; Future  -     Refer to Emergency Dept.    Shortness of breath    Screening for diabetes mellitus  -     Hemoglobin A1C; Future  -     Hemoglobin A1C    Dorsalgia, unspecified  -     X-Ray Lumbar Spine Ap And Lateral; Future    Flank pain  -     X-Ray Ribs 3 Views Bilateral; Future  -     US Retroperitoneal Complete; Future    Excess skin of breast  -     US Breast Left Complete; Future    Other orders  -     sildenafiL (VIAGRA) 100 MG tablet; Take 1 tablet (100 mg total) by mouth daily as needed for Erectile Dysfunction.

## 2022-09-29 DIAGNOSIS — R07.9 CHEST PAIN, UNSPECIFIED TYPE: Primary | ICD-10-CM

## 2022-10-07 ENCOUNTER — HOSPITAL ENCOUNTER (OUTPATIENT)
Dept: CARDIOLOGY | Facility: HOSPITAL | Age: 57
Discharge: HOME | End: 2022-10-07
Attending: INTERNAL MEDICINE

## 2022-10-07 VITALS
DIASTOLIC BLOOD PRESSURE: 81 MMHG | HEART RATE: 93 BPM | WEIGHT: 293 LBS | SYSTOLIC BLOOD PRESSURE: 113 MMHG | BODY MASS INDEX: 39.68 KG/M2 | HEIGHT: 72 IN

## 2022-10-07 DIAGNOSIS — R07.9 CHEST PAIN, UNSPECIFIED TYPE: ICD-10-CM

## 2022-10-07 PROCEDURE — 93017 CV STRESS TEST TRACING ONLY: CPT

## 2022-10-07 PROCEDURE — 93018 CV STRESS TEST I&R ONLY: CPT | Mod: ,,, | Performed by: INTERNAL MEDICINE

## 2022-10-07 PROCEDURE — 93016 EXERCISE STRESS - EKG (CUPID ONLY): ICD-10-PCS | Mod: ,,, | Performed by: NURSE PRACTITIONER

## 2022-10-07 PROCEDURE — 93016 CV STRESS TEST SUPVJ ONLY: CPT | Mod: ,,, | Performed by: NURSE PRACTITIONER

## 2022-10-07 PROCEDURE — 93018 EXERCISE STRESS - EKG (CUPID ONLY): ICD-10-PCS | Mod: ,,, | Performed by: INTERNAL MEDICINE

## 2022-11-16 LAB
CV STRESS BASE HR: 93 BPM
DIASTOLIC BLOOD PRESSURE: 81 MMHG
OHS CV CPX 1 MINUTE RECOVERY HEART RATE: 98 BPM
OHS CV CPX 85 PERCENT MAX PREDICTED HEART RATE MALE: 138
OHS CV CPX MAX PREDICTED HEART RATE: 162
OHS CV CPX PATIENT IS FEMALE: 0
OHS CV CPX PATIENT IS MALE: 1
OHS CV CPX PEAK DIASTOLIC BLOOD PRESSURE: 84 MMHG
OHS CV CPX PEAK HEAR RATE: 134 BPM
OHS CV CPX PEAK RATE PRESSURE PRODUCT: NORMAL
OHS CV CPX PEAK SYSTOLIC BLOOD PRESSURE: 162 MMHG
OHS CV CPX PERCENT MAX PREDICTED HEART RATE ACHIEVED: 83
OHS CV CPX RATE PRESSURE PRODUCT PRESENTING: NORMAL
STRESS ECHO POST EXERCISE DUR MIN: 6 MINUTES
STRESS ECHO POST EXERCISE DUR SEC: 18 SECONDS
SYSTOLIC BLOOD PRESSURE: 113 MMHG

## 2023-01-03 ENCOUNTER — TELEPHONE (OUTPATIENT)
Dept: SLEEP MEDICINE | Facility: CLINIC | Age: 58
End: 2023-01-03

## 2023-02-07 ENCOUNTER — TELEPHONE (OUTPATIENT)
Dept: SLEEP MEDICINE | Facility: CLINIC | Age: 58
End: 2023-02-07

## 2023-03-03 RX ORDER — CHLORTHALIDONE 25 MG/1
TABLET ORAL
Qty: 90 TABLET | Refills: 1 | OUTPATIENT
Start: 2023-03-03

## 2023-03-03 NOTE — TELEPHONE ENCOUNTER
Call made to pt regarding med refill; pt will need to have an appt due to last seen in sept of 22'. No answer left message.

## 2023-03-13 ENCOUNTER — TELEPHONE (OUTPATIENT)
Dept: FAMILY MEDICINE | Facility: CLINIC | Age: 58
End: 2023-03-13

## 2023-03-13 NOTE — TELEPHONE ENCOUNTER
1317- call made to pt regarding med refill; pt will need to have an appt due to last seen in Sept of 22'. No answer left message.

## 2023-05-16 ENCOUNTER — TELEPHONE (OUTPATIENT)
Dept: SLEEP MEDICINE | Facility: CLINIC | Age: 58
End: 2023-05-16

## 2023-05-22 ENCOUNTER — OFFICE VISIT (OUTPATIENT)
Dept: FAMILY MEDICINE | Facility: CLINIC | Age: 58
End: 2023-05-22

## 2023-05-22 VITALS
HEART RATE: 86 BPM | HEIGHT: 73 IN | DIASTOLIC BLOOD PRESSURE: 79 MMHG | SYSTOLIC BLOOD PRESSURE: 128 MMHG | TEMPERATURE: 98 F | RESPIRATION RATE: 17 BRPM | BODY MASS INDEX: 38.86 KG/M2 | WEIGHT: 293.19 LBS | OXYGEN SATURATION: 97 %

## 2023-05-22 DIAGNOSIS — Z13.1 SCREENING FOR DIABETES MELLITUS (DM): ICD-10-CM

## 2023-05-22 DIAGNOSIS — E78.00 HIGH CHOLESTEROL: ICD-10-CM

## 2023-05-22 DIAGNOSIS — E55.9 VITAMIN D DEFICIENCY: ICD-10-CM

## 2023-05-22 DIAGNOSIS — M54.10 RADICULOPATHY, UNSPECIFIED SPINAL REGION: ICD-10-CM

## 2023-05-22 DIAGNOSIS — M25.512 LEFT SHOULDER PAIN, UNSPECIFIED CHRONICITY: ICD-10-CM

## 2023-05-22 DIAGNOSIS — I10 ESSENTIAL HYPERTENSION: Primary | ICD-10-CM

## 2023-05-22 DIAGNOSIS — Z12.5 SCREENING PSA (PROSTATE SPECIFIC ANTIGEN): ICD-10-CM

## 2023-05-22 DIAGNOSIS — E66.9 CLASS 2 OBESITY WITH BODY MASS INDEX (BMI) OF 38.0 TO 38.9 IN ADULT, UNSPECIFIED OBESITY TYPE, UNSPECIFIED WHETHER SERIOUS COMORBIDITY PRESENT: ICD-10-CM

## 2023-05-22 LAB
ANION GAP SERPL CALCULATED.3IONS-SCNC: 7 MMOL/L (ref 7–16)
BASOPHILS # BLD AUTO: 0.03 K/UL (ref 0–0.2)
BASOPHILS NFR BLD AUTO: 0.7 % (ref 0–1)
BUN SERPL-MCNC: 13 MG/DL (ref 7–18)
BUN/CREAT SERPL: 16 (ref 6–20)
CALCIUM SERPL-MCNC: 9.3 MG/DL (ref 8.5–10.1)
CHLORIDE SERPL-SCNC: 106 MMOL/L (ref 98–107)
CO2 SERPL-SCNC: 27 MMOL/L (ref 21–32)
CREAT SERPL-MCNC: 0.82 MG/DL (ref 0.7–1.3)
DIFFERENTIAL METHOD BLD: ABNORMAL
EGFR (NO RACE VARIABLE) (RUSH/TITUS): 102 ML/MIN/1.73M2
EOSINOPHIL # BLD AUTO: 0.16 K/UL (ref 0–0.5)
EOSINOPHIL NFR BLD AUTO: 3.5 % (ref 1–4)
ERYTHROCYTE [DISTWIDTH] IN BLOOD BY AUTOMATED COUNT: 13.4 % (ref 11.5–14.5)
EST. AVERAGE GLUCOSE BLD GHB EST-MCNC: 110 MG/DL
GLUCOSE SERPL-MCNC: 108 MG/DL (ref 74–106)
HBA1C MFR BLD HPLC: 5.9 % (ref 4.5–6.6)
HCT VFR BLD AUTO: 46.9 % (ref 40–54)
HGB BLD-MCNC: 15.7 G/DL (ref 13.5–18)
IMM GRANULOCYTES # BLD AUTO: 0.01 K/UL (ref 0–0.04)
IMM GRANULOCYTES NFR BLD: 0.2 % (ref 0–0.4)
LYMPHOCYTES # BLD AUTO: 1.53 K/UL (ref 1–4.8)
LYMPHOCYTES NFR BLD AUTO: 33.3 % (ref 27–41)
MCH RBC QN AUTO: 30.8 PG (ref 27–31)
MCHC RBC AUTO-ENTMCNC: 33.5 G/DL (ref 32–36)
MCV RBC AUTO: 92 FL (ref 80–96)
MONOCYTES # BLD AUTO: 0.48 K/UL (ref 0–0.8)
MONOCYTES NFR BLD AUTO: 10.5 % (ref 2–6)
MPC BLD CALC-MCNC: 11.4 FL (ref 9.4–12.4)
NEUTROPHILS # BLD AUTO: 2.38 K/UL (ref 1.8–7.7)
NEUTROPHILS NFR BLD AUTO: 51.8 % (ref 53–65)
NRBC # BLD AUTO: 0 X10E3/UL
NRBC, AUTO (.00): 0 %
PLATELET # BLD AUTO: 280 K/UL (ref 150–400)
POTASSIUM SERPL-SCNC: 3.3 MMOL/L (ref 3.5–5.1)
PSA SERPL-MCNC: 0.99 NG/ML
RBC # BLD AUTO: 5.1 M/UL (ref 4.6–6.2)
SODIUM SERPL-SCNC: 137 MMOL/L (ref 136–145)
TSH SERPL DL<=0.005 MIU/L-ACNC: 2.43 UIU/ML (ref 0.36–3.74)
WBC # BLD AUTO: 4.59 K/UL (ref 4.5–11)

## 2023-05-22 PROCEDURE — 99214 PR OFFICE/OUTPT VISIT, EST, LEVL IV, 30-39 MIN: ICD-10-PCS | Mod: ,,, | Performed by: INTERNAL MEDICINE

## 2023-05-22 PROCEDURE — 80048 BASIC METABOLIC PANEL: ICD-10-PCS | Mod: ,,, | Performed by: CLINICAL MEDICAL LABORATORY

## 2023-05-22 PROCEDURE — 85025 COMPLETE CBC W/AUTO DIFF WBC: CPT | Mod: ,,, | Performed by: CLINICAL MEDICAL LABORATORY

## 2023-05-22 PROCEDURE — 99214 OFFICE O/P EST MOD 30 MIN: CPT | Mod: ,,, | Performed by: INTERNAL MEDICINE

## 2023-05-22 PROCEDURE — G0103 PSA SCREENING: HCPCS | Mod: ,,, | Performed by: CLINICAL MEDICAL LABORATORY

## 2023-05-22 PROCEDURE — 80048 BASIC METABOLIC PNL TOTAL CA: CPT | Mod: ,,, | Performed by: CLINICAL MEDICAL LABORATORY

## 2023-05-22 PROCEDURE — 84443 TSH: ICD-10-PCS | Mod: ,,, | Performed by: CLINICAL MEDICAL LABORATORY

## 2023-05-22 PROCEDURE — 83036 HEMOGLOBIN GLYCOSYLATED A1C: CPT | Mod: ,,, | Performed by: CLINICAL MEDICAL LABORATORY

## 2023-05-22 PROCEDURE — 85025 CBC WITH DIFFERENTIAL: ICD-10-PCS | Mod: ,,, | Performed by: CLINICAL MEDICAL LABORATORY

## 2023-05-22 PROCEDURE — 84443 ASSAY THYROID STIM HORMONE: CPT | Mod: ,,, | Performed by: CLINICAL MEDICAL LABORATORY

## 2023-05-22 PROCEDURE — 83036 HEMOGLOBIN A1C: ICD-10-PCS | Mod: ,,, | Performed by: CLINICAL MEDICAL LABORATORY

## 2023-05-22 PROCEDURE — G0103 PSA, SCREENING: ICD-10-PCS | Mod: ,,, | Performed by: CLINICAL MEDICAL LABORATORY

## 2023-05-22 NOTE — PROGRESS NOTES
Subjective:       Patient ID: Juan C Carranza is a 57 y.o. male.    Chief Complaint: Results (Blood work done today ) and Shoulder Pain (/Left )    HPI  .  57-year-old gentleman presents with a history of hypertension, dyslipidemia, vitamin-D deficiency and chronic obesity.    Hypertension and the dyslipidemia or chronic medical problems.    Patient is requesting blood work to be done today.    Patient complains of numbness and discomfort intermittent pain in the left upper extremity.  He has occasional neck discomfort.  Patient also complains of intermittent chest pain.    He states that he still has occasional nocturia.  The plan will be to x-ray his C-spine I think he has a radiculopathy in the left upper extremity therefore going to x-rays neck.  Because of the chest pain I will order an exercise stress test he does have cardiac risk factors.  I am going to order Flomax 0.4 mg 1 p.o. q.h.s. for his history of BPH and nocturia.  Patient also requesting that we check a PSA.    In regards to the radiculopathy will start Neurontin 300 mg 1 p.o. q.h.s..  Blood work/health maintenance will check and screening for diabetes by checking hemoglobin A1c check a BNP   Will also check a TSH because he is obese and I am going to order a baseline CBC.    Recommend low carb diet, exercise and lifestyle modification.    Current Medications:    Current Outpatient Medications:     aspirin (ECOTRIN) 81 MG EC tablet, Take 81 mg by mouth once daily., Disp: , Rfl:     atorvastatin (LIPITOR) 20 MG tablet, Take 1 tablet (20 mg total) by mouth once daily., Disp: 90 tablet, Rfl: 1    baloxavir marboxiL (XOFLUZA) 40 mg tablet, Take 40 mg by mouth. TAKE 2 TABS DAILY, Disp: , Rfl:     carvediloL (COREG) 25 MG tablet, TAKE 1 TABLET (25 MG TOTAL) BY MOUTH 2 (TWO) TIMES DAILY WITH MEALS., Disp: 180 tablet, Rfl: 1    cetirizine (ZYRTEC) 10 MG tablet, Take 10 mg by mouth once daily., Disp: , Rfl:     chlorthalidone (HYGROTEN) 25 MG Tab, TAKE 1  TABLET (25 MG TOTAL) BY MOUTH ONCE DAILY., Disp: 90 tablet, Rfl: 1    cholecalciferol, vitamin D3, 1,250 mcg (50,000 unit) capsule, Take 50,000 Units by mouth once a week., Disp: , Rfl:     famotidine (PEPCID) 20 MG tablet, Take 20 mg by mouth 2 (two) times daily., Disp: , Rfl:     sildenafiL (VIAGRA) 100 MG tablet, Take 1 tablet (100 mg total) by mouth daily as needed for Erectile Dysfunction., Disp: 12 tablet, Rfl: 2    sulfamethoxazole-trimethoprim 800-160mg (BACTRIM DS) 800-160 mg Tab, Take 1 tablet by mouth 2 (two) times daily., Disp: 20 tablet, Rfl: 0    gabapentin (NEURONTIN) 300 MG capsule, Take 1 capsule (300 mg total) by mouth nightly as needed., Disp: 30 capsule, Rfl: 2    lisinopriL (PRINIVIL,ZESTRIL) 20 MG tablet, Take 1 tablet (20 mg total) by mouth once daily. (Patient not taking: Reported on 9/27/2022), Disp: 90 tablet, Rfl: 1    naproxen (NAPROSYN) 500 MG tablet, Take 1 tablet (500 mg total) by mouth 2 (two) times daily as needed. (Patient not taking: Reported on 9/27/2022), Disp: 20 tablet, Rfl: 0    naproxen (NAPROSYN) 500 MG tablet, Take 1 tablet (500 mg total) by mouth 2 (two) times daily as needed. (Patient not taking: Reported on 9/27/2022), Disp: 20 tablet, Rfl: 1    tamsulosin (FLOMAX) 0.4 mg Cap, Take 1 capsule (0.4 mg total) by mouth every evening., Disp: 90 capsule, Rfl: 1       The 10-year ASCVD risk score (Lindsey LIRIANO, et al., 2019) is: 11.9%    Values used to calculate the score:      Age: 57 years      Sex: Male      Is Non- : Yes      Diabetic: No      Tobacco smoker: No      Systolic Blood Pressure: 128 mmHg      Is BP treated: Yes      HDL Cholesterol: 40 mg/dL      Total Cholesterol: 139 mg/dL     Review of Systems   Constitutional:  Negative for appetite change and fatigue.   HENT:  Negative for nasal congestion and rhinorrhea.    Eyes:  Negative for redness and visual disturbance.   Respiratory:  Negative for cough and shortness of breath.   "  Cardiovascular:  Negative for chest pain and leg swelling.   Gastrointestinal:  Negative for abdominal pain, constipation and diarrhea.   Endocrine: Negative for polyuria.   Genitourinary:  Negative for difficulty urinating, dysuria and erectile dysfunction.   Musculoskeletal:  Negative for arthralgias and myalgias.   Integumentary:  Negative for rash and mole/lesion.   Neurological:  Positive for numbness.        Left upper extremity numbness   All other systems reviewed and are negative.             Vitals:    05/22/23 1429   BP: 128/79   BP Location: Right arm   Patient Position: Sitting   BP Method: Large (Automatic)   Pulse: 86   Resp: 17   Temp: 97.9 °F (36.6 °C)   TempSrc: Temporal   SpO2: 97%   Weight: 133 kg (293 lb 3.2 oz)   Height: 6' 1" (1.854 m)        Physical Exam  Vitals and nursing note reviewed.   Constitutional:       Appearance: Normal appearance. He is obese.   HENT:      Head: Normocephalic and atraumatic.      Nose: Nose normal.      Mouth/Throat:      Mouth: Mucous membranes are moist.      Pharynx: Oropharynx is clear.   Eyes:      Extraocular Movements: Extraocular movements intact.      Pupils: Pupils are equal, round, and reactive to light.   Cardiovascular:      Rate and Rhythm: Normal rate and regular rhythm.      Pulses: Normal pulses.      Heart sounds: Normal heart sounds.   Pulmonary:      Effort: Pulmonary effort is normal.      Breath sounds: Normal breath sounds.   Abdominal:      General: Abdomen is flat. Bowel sounds are normal.      Palpations: Abdomen is soft.   Musculoskeletal:         General: Normal range of motion.      Cervical back: Normal range of motion and neck supple.      Right lower leg: No edema.      Left lower leg: No edema.   Skin:     General: Skin is warm and dry.      Coloration: Skin is not jaundiced or pale.      Findings: No rash.   Neurological:      General: No focal deficit present.      Mental Status: He is alert and oriented to person, place, and " time. Mental status is at baseline.   Psychiatric:         Mood and Affect: Mood normal.         Thought Content: Thought content normal.         Last Labs:     Office Visit on 05/22/2023   Component Date Value    Sodium 05/22/2023 137     Potassium 05/22/2023 3.3 (L)     Chloride 05/22/2023 106     CO2 05/22/2023 27     Anion Gap 05/22/2023 7     Glucose 05/22/2023 108 (H)     BUN 05/22/2023 13     Creatinine 05/22/2023 0.82     BUN/Creatinine Ratio 05/22/2023 16     Calcium 05/22/2023 9.3     eGFR 05/22/2023 102     Hemoglobin A1C 05/22/2023 5.9     Estimated Average Glucose 05/22/2023 110     PSA Total 05/22/2023 0.988     TSH 05/22/2023 2.430     WBC 05/22/2023 4.59     RBC 05/22/2023 5.10     Hemoglobin 05/22/2023 15.7     Hematocrit 05/22/2023 46.9     MCV 05/22/2023 92.0     MCH 05/22/2023 30.8     MCHC 05/22/2023 33.5     RDW 05/22/2023 13.4     Platelet Count 05/22/2023 280     MPV 05/22/2023 11.4     Neutrophils % 05/22/2023 51.8 (L)     Lymphocytes % 05/22/2023 33.3     Monocytes % 05/22/2023 10.5 (H)     Eosinophils % 05/22/2023 3.5     Basophils % 05/22/2023 0.7     Immature Granulocytes % 05/22/2023 0.2     nRBC, Auto 05/22/2023 0.0     Neutrophils, Abs 05/22/2023 2.38     Lymphocytes, Absolute 05/22/2023 1.53     Monocytes, Absolute 05/22/2023 0.48     Eosinophils, Absolute 05/22/2023 0.16     Basophils, Absolute 05/22/2023 0.03     Immature Granulocytes, A* 05/22/2023 0.01     nRBC, Absolute 05/22/2023 0.00     Diff Type 05/22/2023 Auto        Last Imaging:  X-Ray Cervical Spine 2 or 3 Views  Narrative: EXAMINATION:  XR CERVICAL SPINE 2 OR 3 VIEWS    CLINICAL HISTORY:  Pain in left shoulder    TECHNIQUE:  AP, lateral and open mouth views of the cervical spine were performed.    COMPARISON:  None.    FINDINGS:  The vertebral body heights and alignment are maintained.  Moderate degenerative endplate and facet changes are seen with osteophyte formation.  Impression: Multilevel degenerative  changes.    Electronically signed by: Luis E Sequeira  Date:    05/22/2023  Time:    15:26         **Labs and x-rays personally reviewed by me    ** reviewed      Objective:        Assessment:       1. Essential hypertension  Basic Metabolic Panel    CBC Auto Differential    Basic Metabolic Panel    CBC Auto Differential    Exercise Stress - EKG      2. High cholesterol  TSH    TSH      3. Screening PSA (prostate specific antigen)  PSA, Screening    PSA, Screening      4. Screening for diabetes mellitus (DM)  Hemoglobin A1C    Hemoglobin A1C      5. Left shoulder pain, unspecified chronicity        6. Radiculopathy, unspecified spinal region  X-Ray Cervical Spine 2 or 3 Views      7. Vitamin D deficiency        8. Class 2 obesity with body mass index (BMI) of 38.0 to 38.9 in adult, unspecified obesity type, unspecified whether serious comorbidity present             Plan:         [unfilled]

## 2023-05-23 ENCOUNTER — TELEPHONE (OUTPATIENT)
Dept: FAMILY MEDICINE | Facility: CLINIC | Age: 58
End: 2023-05-23

## 2023-05-23 PROBLEM — M54.10 RADICULOPATHY: Status: ACTIVE | Noted: 2023-05-23

## 2023-05-23 PROBLEM — Z13.1 SCREENING FOR DIABETES MELLITUS (DM): Status: ACTIVE | Noted: 2023-05-23

## 2023-05-23 PROBLEM — E66.812 CLASS 2 OBESITY WITH BODY MASS INDEX (BMI) OF 38.0 TO 38.9 IN ADULT: Status: ACTIVE | Noted: 2023-05-23

## 2023-05-23 PROBLEM — M25.512 LEFT SHOULDER PAIN: Status: ACTIVE | Noted: 2023-05-23

## 2023-05-23 PROBLEM — Z12.5 SCREENING PSA (PROSTATE SPECIFIC ANTIGEN): Status: ACTIVE | Noted: 2023-05-23

## 2023-05-23 PROBLEM — I10 ESSENTIAL (PRIMARY) HYPERTENSION: Status: ACTIVE | Noted: 2023-05-23

## 2023-05-23 PROBLEM — E78.00 HIGH CHOLESTEROL: Status: ACTIVE | Noted: 2023-05-23

## 2023-05-23 PROBLEM — E66.9 CLASS 2 OBESITY WITH BODY MASS INDEX (BMI) OF 38.0 TO 38.9 IN ADULT: Status: ACTIVE | Noted: 2023-05-23

## 2023-05-23 PROBLEM — E55.9 VITAMIN D DEFICIENCY: Status: ACTIVE | Noted: 2023-05-23

## 2023-05-23 RX ORDER — GABAPENTIN 300 MG/1
300 CAPSULE ORAL NIGHTLY PRN
Qty: 30 CAPSULE | Refills: 2 | Status: SHIPPED | OUTPATIENT
Start: 2023-05-23

## 2023-05-23 RX ORDER — TAMSULOSIN HYDROCHLORIDE 0.4 MG/1
0.4 CAPSULE ORAL NIGHTLY
Qty: 90 CAPSULE | Refills: 1 | Status: SHIPPED | OUTPATIENT
Start: 2023-05-23

## 2023-05-23 NOTE — TELEPHONE ENCOUNTER
----- Message from Burt Martinez MD sent at 5/22/2023  4:40 PM CDT -----  Need to see this week,   Abnormal x-ray of his cervical spine    0818 Call made to pt regarding above message; pt scheduled to come in on 5/24/23; pt has no further questions or concerns at this time

## 2023-05-24 ENCOUNTER — OFFICE VISIT (OUTPATIENT)
Dept: FAMILY MEDICINE | Facility: CLINIC | Age: 58
End: 2023-05-24

## 2023-05-24 VITALS
WEIGHT: 292.19 LBS | RESPIRATION RATE: 18 BRPM | SYSTOLIC BLOOD PRESSURE: 137 MMHG | HEART RATE: 80 BPM | HEIGHT: 73 IN | OXYGEN SATURATION: 97 % | BODY MASS INDEX: 38.73 KG/M2 | DIASTOLIC BLOOD PRESSURE: 99 MMHG | TEMPERATURE: 97 F

## 2023-05-24 DIAGNOSIS — M19.90 OSTEOARTHRITIS, UNSPECIFIED OSTEOARTHRITIS TYPE, UNSPECIFIED SITE: ICD-10-CM

## 2023-05-24 DIAGNOSIS — G89.29 CHRONIC BILATERAL LOW BACK PAIN WITHOUT SCIATICA: ICD-10-CM

## 2023-05-24 DIAGNOSIS — E66.9 CLASS 2 OBESITY WITH BODY MASS INDEX (BMI) OF 38.0 TO 38.9 IN ADULT, UNSPECIFIED OBESITY TYPE, UNSPECIFIED WHETHER SERIOUS COMORBIDITY PRESENT: ICD-10-CM

## 2023-05-24 DIAGNOSIS — Z12.11 SCREENING FOR COLON CANCER: ICD-10-CM

## 2023-05-24 DIAGNOSIS — Z11.4 SCREENING FOR HIV (HUMAN IMMUNODEFICIENCY VIRUS): Primary | ICD-10-CM

## 2023-05-24 DIAGNOSIS — M54.50 CHRONIC BILATERAL LOW BACK PAIN WITHOUT SCIATICA: ICD-10-CM

## 2023-05-24 DIAGNOSIS — Z11.59 NEED FOR HEPATITIS C SCREENING TEST: ICD-10-CM

## 2023-05-24 LAB
HCV AB SER QL: NORMAL
HIV 1+O+2 AB SERPL QL: NORMAL

## 2023-05-24 PROCEDURE — 87389 HIV-1 AG W/HIV-1&-2 AB AG IA: CPT | Mod: ,,, | Performed by: CLINICAL MEDICAL LABORATORY

## 2023-05-24 PROCEDURE — 90715 TDAP VACCINE GREATER THAN OR EQUAL TO 7YO IM: ICD-10-PCS | Mod: ,,, | Performed by: INTERNAL MEDICINE

## 2023-05-24 PROCEDURE — 99214 PR OFFICE/OUTPT VISIT, EST, LEVL IV, 30-39 MIN: ICD-10-PCS | Mod: ,,, | Performed by: INTERNAL MEDICINE

## 2023-05-24 PROCEDURE — 90471 IMMUNIZATION ADMIN: CPT | Mod: ,,, | Performed by: INTERNAL MEDICINE

## 2023-05-24 PROCEDURE — 86803 HEPATITIS C ANTIBODY: ICD-10-PCS | Mod: ,,, | Performed by: CLINICAL MEDICAL LABORATORY

## 2023-05-24 PROCEDURE — 99214 OFFICE O/P EST MOD 30 MIN: CPT | Mod: ,,, | Performed by: INTERNAL MEDICINE

## 2023-05-24 PROCEDURE — 90715 TDAP VACCINE 7 YRS/> IM: CPT | Mod: ,,, | Performed by: INTERNAL MEDICINE

## 2023-05-24 PROCEDURE — 87389 HIV 1 / 2 ANTIBODY: ICD-10-PCS | Mod: ,,, | Performed by: CLINICAL MEDICAL LABORATORY

## 2023-05-24 PROCEDURE — 90471 TDAP VACCINE GREATER THAN OR EQUAL TO 7YO IM: ICD-10-PCS | Mod: ,,, | Performed by: INTERNAL MEDICINE

## 2023-05-24 PROCEDURE — 86803 HEPATITIS C AB TEST: CPT | Mod: ,,, | Performed by: CLINICAL MEDICAL LABORATORY

## 2023-05-25 DIAGNOSIS — Z12.11 SCREENING FOR COLON CANCER: Primary | ICD-10-CM

## 2023-05-25 RX ORDER — POLYETHYLENE GLYCOL 3350, SODIUM SULFATE ANHYDROUS, SODIUM BICARBONATE, SODIUM CHLORIDE, POTASSIUM CHLORIDE 236; 22.74; 6.74; 5.86; 2.97 G/4L; G/4L; G/4L; G/4L; G/4L
4 POWDER, FOR SOLUTION ORAL ONCE
Qty: 4000 ML | Refills: 0 | Status: SHIPPED | OUTPATIENT
Start: 2023-05-25 | End: 2023-05-25

## 2023-05-26 PROBLEM — G89.29 CHRONIC BILATERAL LOW BACK PAIN WITHOUT SCIATICA: Status: ACTIVE | Noted: 2023-05-26

## 2023-05-26 PROBLEM — M54.50 CHRONIC BILATERAL LOW BACK PAIN WITHOUT SCIATICA: Status: ACTIVE | Noted: 2023-05-26

## 2023-05-26 PROBLEM — Z12.11 SCREENING FOR COLON CANCER: Status: ACTIVE | Noted: 2023-05-23

## 2023-05-26 PROBLEM — M19.90 OSTEOARTHRITIS: Status: ACTIVE | Noted: 2023-05-26

## 2023-05-26 PROBLEM — Z11.4 SCREENING FOR HIV (HUMAN IMMUNODEFICIENCY VIRUS): Status: ACTIVE | Noted: 2023-05-23

## 2023-05-26 PROBLEM — Z11.59 NEED FOR HEPATITIS C SCREENING TEST: Status: ACTIVE | Noted: 2023-05-26

## 2023-05-26 NOTE — PROGRESS NOTES
Subjective:       Patient ID: Juan C Carranza is a 57 y.o. male.    Chief Complaint: Results (X ray results )    HPI  .  49-year-old male presents with a history of hypertension, complains of low back pain and also complains of worsening pain in the neck.  The patient degenerative joint disease of the C-spine.  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    Patient does have chronic obesity.    The plan refer to ortho spine specialist for the degenerative joint disease of the C-spine, consult physical therapy.    Health maintenance issues will order hepatitis-C HIV and tetanus toxoid test.    Will also refer to GI for colonoscopy.    Current Medications:    Current Outpatient Medications:     aspirin (ECOTRIN) 81 MG EC tablet, Take 81 mg by mouth once daily., Disp: , Rfl:     atorvastatin (LIPITOR) 20 MG tablet, Take 1 tablet (20 mg total) by mouth once daily., Disp: 90 tablet, Rfl: 1    baloxavir marboxiL (XOFLUZA) 40 mg tablet, Take 40 mg by mouth. TAKE 2 TABS DAILY, Disp: , Rfl:     carvediloL (COREG) 25 MG tablet, TAKE 1 TABLET (25 MG TOTAL) BY MOUTH 2 (TWO) TIMES DAILY WITH MEALS., Disp: 180 tablet, Rfl: 1    cetirizine (ZYRTEC) 10 MG tablet, Take 10 mg by mouth once daily., Disp: , Rfl:     chlorthalidone (HYGROTEN) 25 MG Tab, TAKE 1 TABLET (25 MG TOTAL) BY MOUTH ONCE DAILY., Disp: 90 tablet, Rfl: 1    cholecalciferol, vitamin D3, 1,250 mcg (50,000 unit) capsule, Take 50,000 Units by mouth once a week., Disp: , Rfl:     famotidine (PEPCID) 20 MG tablet, Take 20 mg by mouth 2 (two) times daily., Disp: , Rfl:     gabapentin (NEURONTIN) 300 MG capsule, Take 1 capsule (300 mg total) by mouth nightly as needed., Disp: 30 capsule, Rfl: 2    sildenafiL (VIAGRA) 100 MG tablet, Take 1 tablet (100 mg total) by mouth daily as needed for Erectile Dysfunction., Disp: 12 tablet, Rfl: 2    tamsulosin (FLOMAX) 0.4 mg Cap, Take 1 capsule (0.4 mg total) by mouth every evening., Disp: 90 capsule, Rfl: 1     "lisinopriL (PRINIVIL,ZESTRIL) 20 MG tablet, Take 1 tablet (20 mg total) by mouth once daily. (Patient not taking: Reported on 9/27/2022), Disp: 90 tablet, Rfl: 1    naproxen (NAPROSYN) 500 MG tablet, Take 1 tablet (500 mg total) by mouth 2 (two) times daily as needed. (Patient not taking: Reported on 9/27/2022), Disp: 20 tablet, Rfl: 0    naproxen (NAPROSYN) 500 MG tablet, Take 1 tablet (500 mg total) by mouth 2 (two) times daily as needed. (Patient not taking: Reported on 9/27/2022), Disp: 20 tablet, Rfl: 1    sulfamethoxazole-trimethoprim 800-160mg (BACTRIM DS) 800-160 mg Tab, Take 1 tablet by mouth 2 (two) times daily. (Patient not taking: Reported on 5/24/2023), Disp: 20 tablet, Rfl: 0           Review of Systems   Constitutional:  Negative for appetite change and fatigue.   HENT:  Negative for nasal congestion and rhinorrhea.    Eyes:  Negative for redness and visual disturbance.   Respiratory:  Negative for cough and shortness of breath.    Cardiovascular:  Negative for chest pain and leg swelling.   Gastrointestinal:  Negative for abdominal pain, constipation and diarrhea.   Endocrine: Negative for polyuria.   Genitourinary:  Negative for difficulty urinating, dysuria and erectile dysfunction.   Musculoskeletal:  Positive for arthralgias, back pain and neck pain. Negative for myalgias.   Integumentary:  Negative for rash and mole/lesion.   All other systems reviewed and are negative.             Vitals:    05/24/23 1536 05/24/23 1537   BP: (!) 141/99 (!) 137/99   BP Location: Right arm Left arm   Patient Position: Sitting    BP Method: Large (Automatic)    Pulse: 80    Resp: 18    Temp: 97.4 °F (36.3 °C)    TempSrc: Temporal    SpO2: 97%    Weight: 132.5 kg (292 lb 3.2 oz)    Height: 6' 1" (1.854 m)         Physical Exam  Vitals and nursing note reviewed.   Constitutional:       Appearance: Normal appearance.   Cardiovascular:      Rate and Rhythm: Normal rate and regular rhythm.      Pulses: Normal pulses. "      Heart sounds: Normal heart sounds.   Pulmonary:      Effort: Pulmonary effort is normal.      Breath sounds: Normal breath sounds.   Abdominal:      General: Abdomen is flat. Bowel sounds are normal.      Palpations: Abdomen is soft.   Musculoskeletal:         General: Normal range of motion.   Skin:     General: Skin is warm and dry.   Neurological:      General: No focal deficit present.      Mental Status: He is alert and oriented to person, place, and time. Mental status is at baseline.         Last Labs:     Office Visit on 05/24/2023   Component Date Value    HIV 1/2 05/24/2023 Non-Reactive     Hepatitis C Ab 05/24/2023 Non-Reactive    Office Visit on 05/22/2023   Component Date Value    Sodium 05/22/2023 137     Potassium 05/22/2023 3.3 (L)     Chloride 05/22/2023 106     CO2 05/22/2023 27     Anion Gap 05/22/2023 7     Glucose 05/22/2023 108 (H)     BUN 05/22/2023 13     Creatinine 05/22/2023 0.82     BUN/Creatinine Ratio 05/22/2023 16     Calcium 05/22/2023 9.3     eGFR 05/22/2023 102     Hemoglobin A1C 05/22/2023 5.9     Estimated Average Glucose 05/22/2023 110     PSA Total 05/22/2023 0.988     TSH 05/22/2023 2.430     WBC 05/22/2023 4.59     RBC 05/22/2023 5.10     Hemoglobin 05/22/2023 15.7     Hematocrit 05/22/2023 46.9     MCV 05/22/2023 92.0     MCH 05/22/2023 30.8     MCHC 05/22/2023 33.5     RDW 05/22/2023 13.4     Platelet Count 05/22/2023 280     MPV 05/22/2023 11.4     Neutrophils % 05/22/2023 51.8 (L)     Lymphocytes % 05/22/2023 33.3     Monocytes % 05/22/2023 10.5 (H)     Eosinophils % 05/22/2023 3.5     Basophils % 05/22/2023 0.7     Immature Granulocytes % 05/22/2023 0.2     nRBC, Auto 05/22/2023 0.0     Neutrophils, Abs 05/22/2023 2.38     Lymphocytes, Absolute 05/22/2023 1.53     Monocytes, Absolute 05/22/2023 0.48     Eosinophils, Absolute 05/22/2023 0.16     Basophils, Absolute 05/22/2023 0.03     Immature Granulocytes, A* 05/22/2023 0.01     nRBC, Absolute 05/22/2023 0.00     Diff  Type 05/22/2023 Auto        Last Imaging:  X-Ray Cervical Spine 2 or 3 Views  Narrative: EXAMINATION:  XR CERVICAL SPINE 2 OR 3 VIEWS    CLINICAL HISTORY:  Pain in left shoulder    TECHNIQUE:  AP, lateral and open mouth views of the cervical spine were performed.    COMPARISON:  None.    FINDINGS:  The vertebral body heights and alignment are maintained.  Moderate degenerative endplate and facet changes are seen with osteophyte formation.  Impression: Multilevel degenerative changes.    Electronically signed by: Luis E Sequeira  Date:    05/22/2023  Time:    15:26         **Labs and x-rays personally reviewed by me    ** reviewed      Objective:        Assessment:       1. Screening for HIV (human immunodeficiency virus)  HIV 1/2 Ag/Ab (4th Gen)    HIV 1/2 Ag/Ab (4th Gen)      2. Need for hepatitis C screening test  Hepatitis C Antibody    Hepatitis C Antibody      3. Screening for colon cancer  Ambulatory referral/consult to Gastroenterology      4. Osteoarthritis, unspecified osteoarthritis type, unspecified site  Ambulatory referral/consult to [facility]    Ambulatory referral/consult to Physical/Occupational Therapy      5. Class 2 obesity with body mass index (BMI) of 38.0 to 38.9 in adult, unspecified obesity type, unspecified whether serious comorbidity present        6. Chronic bilateral low back pain without sciatica             Plan:         [unfilled]

## 2023-06-07 ENCOUNTER — OFFICE VISIT (OUTPATIENT)
Dept: SPINE | Facility: CLINIC | Age: 58
End: 2023-06-07

## 2023-06-07 ENCOUNTER — HOSPITAL ENCOUNTER (OUTPATIENT)
Dept: RADIOLOGY | Facility: HOSPITAL | Age: 58
Discharge: HOME | End: 2023-06-07
Attending: NURSE PRACTITIONER

## 2023-06-07 VITALS
DIASTOLIC BLOOD PRESSURE: 80 MMHG | TEMPERATURE: 98 F | OXYGEN SATURATION: 98 % | WEIGHT: 292 LBS | SYSTOLIC BLOOD PRESSURE: 120 MMHG | RESPIRATION RATE: 18 BRPM | HEART RATE: 90 BPM | BODY MASS INDEX: 38.7 KG/M2 | HEIGHT: 73 IN

## 2023-06-07 DIAGNOSIS — M54.12 CERVICAL RADICULOPATHY: ICD-10-CM

## 2023-06-07 DIAGNOSIS — M19.90 OSTEOARTHRITIS, UNSPECIFIED OSTEOARTHRITIS TYPE, UNSPECIFIED SITE: ICD-10-CM

## 2023-06-07 DIAGNOSIS — M54.12 CERVICAL RADICULOPATHY: Primary | ICD-10-CM

## 2023-06-07 PROCEDURE — 99204 OFFICE O/P NEW MOD 45 MIN: CPT | Mod: S$PBB,,, | Performed by: NURSE PRACTITIONER

## 2023-06-07 PROCEDURE — 99204 PR OFFICE/OUTPT VISIT, NEW, LEVL IV, 45-59 MIN: ICD-10-PCS | Mod: S$PBB,,, | Performed by: NURSE PRACTITIONER

## 2023-06-07 PROCEDURE — 72050 XR CERVICAL SPINE AP LAT WITH FLEX EXTEN: ICD-10-PCS | Mod: 26,,, | Performed by: RADIOLOGY

## 2023-06-07 PROCEDURE — 72050 X-RAY EXAM NECK SPINE 4/5VWS: CPT | Mod: TC

## 2023-06-07 PROCEDURE — 99215 OFFICE O/P EST HI 40 MIN: CPT | Mod: PBBFAC | Performed by: NURSE PRACTITIONER

## 2023-06-07 PROCEDURE — 72050 X-RAY EXAM NECK SPINE 4/5VWS: CPT | Mod: 26,,, | Performed by: RADIOLOGY

## 2023-06-07 NOTE — PROGRESS NOTES
MDM/time:  Greater than 45 minutes spent on this encounter including 15 minutes reviewing imaging and notes, 20 minutes with the patient, 10 minutes documentation    ASSESSMENT:  57 y.o. male with cervical spondylosis with radiculopathy    PLAN:  Patient assistance form given he is going to work on getting some sort of insurance assistance  Physical therapy exercise booklet given  Would increase Neurontin 300 mg to 1 tablet 3 times a day  Follow-up in 2 months    HPI:  57 y.o. male here for evaluation of left-sided neck pain that radiates into the left shoulder blade and sometimes into the chest wall on the left side.  He also reports numbness and tingling underneath his left arm and into his left fingertips.  He reports he is had a history of neck pain off and on for quite some time.  No difficulty with  strength.  No issues with balance or falls.  Denies bladder bowel incontinence.  No difficulty walking distances.  Currently takes Tylenol as needed for pain.  He does have a prescription for Neurontin 300 mg for bedtime that he takes occasionally.  No recent physical therapy.  No prior pain management.  No recent MRI.  No prior spine surgery.  Patient is not a smoker.    IMAGING:  X-Ray Cervical Spine AP Lat with Flex Ex  Narrative: EXAMINATION:  XR CERVICAL SPINE AP LAT WITH FLEX EXTEN    CLINICAL HISTORY:  Radiculopathy, cervical region    COMPARISON:  Cervical spine x-ray May 22, 2023    TECHNIQUE:  Frontal and lateral views of the cervical spine.  Lateral views obtained in the neutral, flexion, and extension positions.    FINDINGS:  There is straightening of normal cervical lordosis which may be positional or secondary to muscle spasm.  No significant anterolisthesis or retrolisthesis.  Moderate to severe loss of disc space height at C4-5, C5-6, C6-7, and C7-T1.  Multilevel moderate marginal vertebral body osteophyte formation.  Cervical vertebral body heights appear maintained.  Impression:  Degenerative change and straightening of the cervical spine as detailed above.    Point of Service: [facility]    Electronically signed by: David Mauricio  Date:    06/07/2023  Time:    15:09       Past Medical History:   Diagnosis Date    Cardiomegaly     Hyperlipidemia     Hypertension     Palpitations      History reviewed. No pertinent surgical history.  Social History     Tobacco Use    Smoking status: Never    Smokeless tobacco: Never   Substance Use Topics    Alcohol use: Yes      Current Outpatient Medications   Medication Instructions    aspirin (ECOTRIN) 81 mg, Oral, Daily    atorvastatin (LIPITOR) 20 mg, Oral, Daily    baloxavir marboxiL (XOFLUZA) 40 mg, Oral, TAKE 2 TABS DAILY     carvediloL (COREG) 25 mg, Oral, 2 times daily with meals    cetirizine (ZYRTEC) 10 mg, Oral, Daily    chlorthalidone (HYGROTEN) 25 mg, Oral, Daily    cholecalciferol (vitamin D3) 50,000 Units, Oral, Weekly    famotidine (PEPCID) 20 mg, Oral, 2 times daily    gabapentin (NEURONTIN) 300 mg, Oral, Nightly PRN    lisinopriL (PRINIVIL,ZESTRIL) 20 mg, Oral, Daily    naproxen (NAPROSYN) 500 mg, Oral, 2 times daily PRN    naproxen (NAPROSYN) 500 mg, Oral, 2 times daily PRN    sildenafiL (VIAGRA) 100 mg, Oral, Daily PRN    sulfamethoxazole-trimethoprim 800-160mg (BACTRIM DS) 800-160 mg Tab 1 tablet, Oral, 2 times daily    tamsulosin (FLOMAX) 0.4 mg, Oral, Nightly        EXAM:  Constitutional  General Appearance:  Body mass index is 38.52 kg/m²., NAD  Psychiatric   Orientation: Oriented to time, oriented to place, oriented to person  Mood and Affect: Active and alert, normal mood, normal affect  Gait and Station   Appearance:  Normal gait, normal tandem gait, able to walk on toes, able to walk on heels    CERVICAL  Musculoskeletal System  Shoulder:  normal appearance, no instability, no tenderness, normal ROM right, normal ROM left, no pain with ROM    Cervical Spine  Inspection:  alignment normal, no muscle atrophy  Soft  Tissue Palpation on the Right:  no tenderness of the paracervicals, no tenderness of the trapezius, no tenderness of the rhomboid  Soft Tissue Palpation on the Left:  no tenderness of the paracervicals, no tenderness of the trapezius, no tenderness of the rhomboid  Bony Palpation:  no tenderness of the occipital protuberance  Active Range:     Flexion normal, Extension normal, Rotation to the left normal, Rotation to the right normal, Lateral flexion to the left normal, Lateral flexion to the right normal   Pain elicited on motion    Motor Strength  C5 on the right:  abduction deltoid 5/5  C5 on the left:  abduction deltoid 5/5  C6 on the Right:  flexion biceps 5/5, wrist extension 5/5  C6 on the Left:  flexion biceps 5/5, wrist extension 5/5  C7 on the Right:  extension fingers 5/5, extension triceps 5/5  C7 on the Left:  extension fingers 5/5, extension triceps 5/5  C8 on the Right:  flexion fingers 5/5  C8 on the Left:  flexion fingers 4/5  T1 on the Right:  abduction fingers 5/5  T1 on the Left:  abduction fingers 4/5    Neurological System  Sensation on the Right:  normal sensation of the extremities: right, normal median nerve distribution, normal ulnar nerve distribution  Sensation on the Left:  normal sensation of the extremities: left, normal median nerve distribution, normal ulnar nerve distribution  Biceps Reflex Right:  normal, Brachioradialis Reflex Right:  normal, Triceps Reflex Right: normal  Biceps Reflex Left:  normal, Brachioradialis Reflex Left: normal, Triceps Reflex Left:  normal  Special Test on the Right:  Spurlings test negative, Hoffmans reflex absent, no ankle clonus, Durkan test negative, Tinels sign negative at the elbow  Special Test on the Left:  Spurlings test negative, Hoffmans reflex absent, no ankle clonus, Durkan test negative, Tinels sign negative at the elbow    Skin:   Head and Neck:  normal   Right Upper Extremity:  normal   Left Upper Extremity:   normal    Cardiovascular:   Arterial Pulses Right:  radial right   Arterial Pulses Left:  radial left   Edema Right:  none   Edema Left:  none

## 2023-10-23 ENCOUNTER — OFFICE VISIT (OUTPATIENT)
Dept: FAMILY MEDICINE | Facility: CLINIC | Age: 58
End: 2023-10-23

## 2023-10-23 VITALS
OXYGEN SATURATION: 99 % | BODY MASS INDEX: 38.11 KG/M2 | HEART RATE: 66 BPM | TEMPERATURE: 98 F | WEIGHT: 281.38 LBS | SYSTOLIC BLOOD PRESSURE: 121 MMHG | DIASTOLIC BLOOD PRESSURE: 86 MMHG | HEIGHT: 72 IN | RESPIRATION RATE: 18 BRPM

## 2023-10-23 DIAGNOSIS — R07.9 CHEST PAIN, UNSPECIFIED TYPE: ICD-10-CM

## 2023-10-23 DIAGNOSIS — E66.09 CLASS 2 OBESITY DUE TO EXCESS CALORIES WITH BODY MASS INDEX (BMI) OF 38.0 TO 38.9 IN ADULT, UNSPECIFIED WHETHER SERIOUS COMORBIDITY PRESENT: ICD-10-CM

## 2023-10-23 DIAGNOSIS — I10 ESSENTIAL HYPERTENSION: ICD-10-CM

## 2023-10-23 DIAGNOSIS — R10.9 ABDOMINAL PAIN, UNSPECIFIED ABDOMINAL LOCATION: Primary | ICD-10-CM

## 2023-10-23 PROCEDURE — 99214 PR OFFICE/OUTPT VISIT, EST, LEVL IV, 30-39 MIN: ICD-10-PCS | Mod: ,,, | Performed by: INTERNAL MEDICINE

## 2023-10-23 PROCEDURE — 99214 OFFICE O/P EST MOD 30 MIN: CPT | Mod: ,,, | Performed by: INTERNAL MEDICINE

## 2023-10-23 RX ORDER — CARVEDILOL 25 MG/1
25 TABLET ORAL 2 TIMES DAILY WITH MEALS
Qty: 180 TABLET | Refills: 1 | Status: SHIPPED | OUTPATIENT
Start: 2023-10-23

## 2023-10-23 NOTE — PROGRESS NOTES
Subjective:       Patient ID: Juan C Carranza is a 58 y.o. male.    Chief Complaint: Medication Refill  Patient seen and evaluated does complain abdominal pain and occasional chest.  It is on the left side of the body patient has chronic hypertension also obesity   The plan PA and lateral chest x-ray KUB today   Recommend diet exercise and lifestyle modification.  And will refill the patient's Coreg today.  Medication Refill  Pertinent negatives include no abdominal pain, arthralgias, chest pain, congestion, coughing, fatigue, myalgias or rash.     .    Current Medications:    Current Outpatient Medications:     aspirin (ECOTRIN) 81 MG EC tablet, Take 81 mg by mouth once daily., Disp: , Rfl:     atorvastatin (LIPITOR) 20 MG tablet, Take 1 tablet (20 mg total) by mouth once daily., Disp: 90 tablet, Rfl: 1    baloxavir marboxiL (XOFLUZA) 40 mg tablet, Take 40 mg by mouth. TAKE 2 TABS DAILY, Disp: , Rfl:     cetirizine (ZYRTEC) 10 MG tablet, Take 10 mg by mouth once daily., Disp: , Rfl:     chlorthalidone (HYGROTEN) 25 MG Tab, TAKE 1 TABLET (25 MG TOTAL) BY MOUTH ONCE DAILY., Disp: 90 tablet, Rfl: 1    cholecalciferol, vitamin D3, 1,250 mcg (50,000 unit) capsule, Take 50,000 Units by mouth once a week., Disp: , Rfl:     famotidine (PEPCID) 20 MG tablet, Take 20 mg by mouth 2 (two) times daily., Disp: , Rfl:     gabapentin (NEURONTIN) 300 MG capsule, Take 1 capsule (300 mg total) by mouth nightly as needed., Disp: 30 capsule, Rfl: 2    lisinopriL (PRINIVIL,ZESTRIL) 20 MG tablet, Take 1 tablet (20 mg total) by mouth once daily., Disp: 90 tablet, Rfl: 1    naproxen (NAPROSYN) 500 MG tablet, Take 1 tablet (500 mg total) by mouth 2 (two) times daily as needed., Disp: 20 tablet, Rfl: 0    naproxen (NAPROSYN) 500 MG tablet, Take 1 tablet (500 mg total) by mouth 2 (two) times daily as needed., Disp: 20 tablet, Rfl: 1    sildenafiL (VIAGRA) 100 MG tablet, Take 1 tablet (100 mg total) by mouth daily as needed for Erectile  Dysfunction., Disp: 12 tablet, Rfl: 2    sulfamethoxazole-trimethoprim 800-160mg (BACTRIM DS) 800-160 mg Tab, Take 1 tablet by mouth 2 (two) times daily., Disp: 20 tablet, Rfl: 0    tamsulosin (FLOMAX) 0.4 mg Cap, Take 1 capsule (0.4 mg total) by mouth every evening., Disp: 90 capsule, Rfl: 1    carvediloL (COREG) 25 MG tablet, Take 1 tablet (25 mg total) by mouth 2 (two) times daily with meals., Disp: 180 tablet, Rfl: 1           Review of Systems   Constitutional:  Negative for appetite change and fatigue.   HENT:  Negative for nasal congestion and rhinorrhea.    Eyes:  Negative for redness and visual disturbance.   Respiratory:  Negative for cough and shortness of breath.    Cardiovascular:  Negative for chest pain and leg swelling.   Gastrointestinal:  Negative for abdominal pain, constipation and diarrhea.   Endocrine: Negative for polyuria.   Genitourinary:  Negative for difficulty urinating, dysuria and erectile dysfunction.   Musculoskeletal:  Negative for arthralgias and myalgias.   Integumentary:  Negative for rash and mole/lesion.   All other systems reviewed and are negative.               Vitals:    10/23/23 1052   BP: 121/86   BP Location: Right arm   Patient Position: Sitting   BP Method: Large (Automatic)   Pulse: 66   Resp: 18   Temp: 97.8 °F (36.6 °C)   TempSrc: Temporal   SpO2: 99%   Weight: 127.6 kg (281 lb 6.4 oz)   Height: 6' (1.829 m)        Physical Exam  Vitals and nursing note reviewed.   Constitutional:       Appearance: Normal appearance. He is obese.   Cardiovascular:      Rate and Rhythm: Normal rate and regular rhythm.      Pulses: Normal pulses.      Heart sounds: Normal heart sounds.   Pulmonary:      Effort: Pulmonary effort is normal.      Breath sounds: Normal breath sounds.   Abdominal:      General: Abdomen is flat. Bowel sounds are normal.      Palpations: Abdomen is soft.   Musculoskeletal:         General: Normal range of motion.   Skin:     General: Skin is warm and dry.    Neurological:      General: No focal deficit present.      Mental Status: He is alert and oriented to person, place, and time. Mental status is at baseline.           Last Labs:     No visits with results within 1 Month(s) from this visit.   Latest known visit with results is:   Office Visit on 05/24/2023   Component Date Value    HIV 1/2 05/24/2023 Non-Reactive     Hepatitis C Ab 05/24/2023 Non-Reactive        Last Imaging:  X-Ray KUB  Narrative: EXAMINATION:  XR KUB    CLINICAL HISTORY:  Abdominal pain    COMPARISON:  27 September 2022    TECHNIQUE:  XR KUB    FINDINGS:  No free fluid or free air seen.  The bowel gas pattern appears within normal limits.  No abnormal calcifications are present.  No other abnormality is identified.  Impression: No evidence of abnormality demonstrated    Electronically signed by: Slim Dumont  Date:    10/23/2023  Time:    12:12  X-Ray Chest PA And Lateral  Narrative: EXAMINATION:  XR CHEST PA AND LATERAL    CLINICAL HISTORY:  Chest pain, unspecified    COMPARISON:  4 September 2018    TECHNIQUE:  XR CHEST PA AND LATERAL    FINDINGS:  The heart and mediastinum are normal in size and configuration.  The pulmonary vascularity is normal in caliber.  No lung infiltrates, effusions, pneumothorax or other abnormality is demonstrated.  Impression: No evidence of cardiopulmonary disease.    Electronically signed by: Slim Dumont  Date:    10/23/2023  Time:    12:11         **Labs and x-rays personally reviewed by me    ** reviewed      Objective:        Assessment:       1. Abdominal pain, unspecified abdominal location  X-Ray KUB      2. Chest pain, unspecified type  X-Ray Chest PA And Lateral      3. Essential hypertension        4. Class 2 obesity due to excess calories with body mass index (BMI) of 38.0 to 38.9 in adult, unspecified whether serious comorbidity present             Plan:         [unfilled]

## 2023-11-02 RX ORDER — ATORVASTATIN CALCIUM 20 MG/1
20 TABLET, FILM COATED ORAL DAILY
Qty: 90 TABLET | Refills: 1 | Status: SHIPPED | OUTPATIENT
Start: 2023-11-02

## 2023-11-22 RX ORDER — CHLORTHALIDONE 25 MG/1
25 TABLET ORAL DAILY
Qty: 90 TABLET | Refills: 1 | Status: SHIPPED | OUTPATIENT
Start: 2023-11-22

## 2024-01-04 ENCOUNTER — OFFICE VISIT (OUTPATIENT)
Dept: FAMILY MEDICINE | Facility: CLINIC | Age: 59
End: 2024-01-04

## 2024-01-04 ENCOUNTER — TELEPHONE (OUTPATIENT)
Dept: FAMILY MEDICINE | Facility: CLINIC | Age: 59
End: 2024-01-04

## 2024-01-04 VITALS
WEIGHT: 275 LBS | HEIGHT: 72 IN | OXYGEN SATURATION: 98 % | HEART RATE: 78 BPM | BODY MASS INDEX: 37.25 KG/M2 | SYSTOLIC BLOOD PRESSURE: 131 MMHG | TEMPERATURE: 97 F | DIASTOLIC BLOOD PRESSURE: 88 MMHG | RESPIRATION RATE: 18 BRPM

## 2024-01-04 DIAGNOSIS — R10.9 LEFT SIDED ABDOMINAL PAIN: ICD-10-CM

## 2024-01-04 DIAGNOSIS — M54.50 LEFT LOW BACK PAIN, UNSPECIFIED CHRONICITY, UNSPECIFIED WHETHER SCIATICA PRESENT: Primary | ICD-10-CM

## 2024-01-04 DIAGNOSIS — E66.9 OBESITY, UNSPECIFIED CLASSIFICATION, UNSPECIFIED OBESITY TYPE, UNSPECIFIED WHETHER SERIOUS COMORBIDITY PRESENT: ICD-10-CM

## 2024-01-04 PROCEDURE — 99214 OFFICE O/P EST MOD 30 MIN: CPT | Mod: ,,, | Performed by: INTERNAL MEDICINE

## 2024-01-04 RX ORDER — NAPROXEN 500 MG/1
500 TABLET ORAL 2 TIMES DAILY
Qty: 30 TABLET | Refills: 0 | Status: SHIPPED | OUTPATIENT
Start: 2024-01-04

## 2024-01-04 NOTE — TELEPHONE ENCOUNTER
----- Message from Burt Martinez MD sent at 1/4/2024  4:40 PM CST -----  Need to see in  1 week please  abnl results     [id number] Pt is scheduled to come in on 01/22/24

## 2024-01-11 NOTE — PROGRESS NOTES
Subjective:       Patient ID: Juan C Carranza is a 58 y.o. male.    Chief Complaint: Chest Pain (Under left lung )    Chest Pain     Patient presents with multiple complaints she complains of left-sided pain does have a history dyspnea she is chronic obesity pain is in the area of the left ribs is worse when she takes a deep breath.  She also complains of diffuse abdominal pain particularly on the left side greater than right side the plan today will be x-ray left rib series KUB PA and lateral chest x-ray for the pain Naprosyn 500 mg 1 p.o. b.i.d..  For obesity recommend diet exercise lifestyle modification.  Patient does have a history of hypertension taking diuretic therefore will check a big a today.  .    Current Medications:    Current Outpatient Medications:     aspirin (ECOTRIN) 81 MG EC tablet, Take 81 mg by mouth once daily., Disp: , Rfl:     atorvastatin (LIPITOR) 20 MG tablet, Take 1 tablet (20 mg total) by mouth once daily., Disp: 90 tablet, Rfl: 1    baloxavir marboxiL (XOFLUZA) 40 mg tablet, Take 40 mg by mouth. TAKE 2 TABS DAILY, Disp: , Rfl:     carvediloL (COREG) 25 MG tablet, Take 1 tablet (25 mg total) by mouth 2 (two) times daily with meals., Disp: 180 tablet, Rfl: 1    cetirizine (ZYRTEC) 10 MG tablet, Take 10 mg by mouth once daily., Disp: , Rfl:     chlorthalidone (HYGROTEN) 25 MG Tab, TAKE 1 TABLET (25 MG TOTAL) BY MOUTH ONCE DAILY., Disp: 90 tablet, Rfl: 1    cholecalciferol, vitamin D3, 1,250 mcg (50,000 unit) capsule, Take 50,000 Units by mouth once a week., Disp: , Rfl:     famotidine (PEPCID) 20 MG tablet, Take 20 mg by mouth 2 (two) times daily., Disp: , Rfl:     gabapentin (NEURONTIN) 300 MG capsule, Take 1 capsule (300 mg total) by mouth nightly as needed., Disp: 30 capsule, Rfl: 2    lisinopriL (PRINIVIL,ZESTRIL) 20 MG tablet, Take 1 tablet (20 mg total) by mouth once daily., Disp: 90 tablet, Rfl: 1    sildenafiL (VIAGRA) 100 MG tablet, Take 1 tablet (100 mg total) by mouth daily as  needed for Erectile Dysfunction., Disp: 12 tablet, Rfl: 2    sulfamethoxazole-trimethoprim 800-160mg (BACTRIM DS) 800-160 mg Tab, Take 1 tablet by mouth 2 (two) times daily., Disp: 20 tablet, Rfl: 0    tamsulosin (FLOMAX) 0.4 mg Cap, Take 1 capsule (0.4 mg total) by mouth every evening., Disp: 90 capsule, Rfl: 1    naproxen (NAPROSYN) 500 MG tablet, Take 1 tablet (500 mg total) by mouth 2 (two) times a day., Disp: 30 tablet, Rfl: 0           Review of Systems   Cardiovascular:  Positive for chest pain.                Vitals:    01/04/24 1401   BP: 131/88   BP Location: Left arm   Patient Position: Sitting   BP Method: Large (Automatic)   Pulse: 78   Resp: 18   Temp: 97.2 °F (36.2 °C)   TempSrc: Temporal   SpO2: 98%   Weight: 124.7 kg (275 lb)   Height: 6' (1.829 m)        Physical Exam  Vitals and nursing note reviewed.   Constitutional:       Appearance: Normal appearance. He is obese.   Cardiovascular:      Rate and Rhythm: Normal rate and regular rhythm.      Pulses: Normal pulses.      Heart sounds: Normal heart sounds.   Pulmonary:      Effort: Pulmonary effort is normal.      Breath sounds: Normal breath sounds.   Abdominal:      General: Abdomen is flat. Bowel sounds are normal.      Palpations: Abdomen is soft.   Musculoskeletal:         General: Normal range of motion.   Skin:     General: Skin is warm and dry.   Neurological:      General: No focal deficit present.      Mental Status: He is alert and oriented to person, place, and time. Mental status is at baseline.           Last Labs:     No visits with results within 1 Month(s) from this visit.   Latest known visit with results is:   Office Visit on 05/24/2023   Component Date Value    HIV 1/2 05/24/2023 Non-Reactive     Hepatitis C Ab 05/24/2023 Non-Reactive        Last Imaging:  X-Ray KUB  Narrative: EXAMINATION:  XR KUB    CLINICAL HISTORY:  Low back pain, unspecified    TECHNIQUE:  Single AP supine view of the abdomen (KUB) was  performed    COMPARISON:  10/23/2023    FINDINGS:  Mild colonic stool.  No bowel obstruction.  No abnormal calcification.  Degenerative changes of the spine and hips similar.  Impression: Mild colonic stool.  Degenerative changes of the spine and hips.    Electronically signed by: Luis E Sequeira  Date:    01/04/2024  Time:    14:51  X-Ray Ribs 2 View Left  Narrative: EXAMINATION:  XR CHEST PA AND LATERAL; XR RIBS 2 VIEW LEFT    CLINICAL HISTORY:  Low back pain, unspecified    TECHNIQUE:  PA and lateral views of the chest were performed.  Two views of the left ribs also performed.    COMPARISON:  10/23/2023    FINDINGS:  Heart size normal.  Lungs clear.  No pneumothorax or pleural effusion.  Degenerative changes of the left glenohumeral acromioclavicular joint with osteophyte formation.  There is no rib fracture detected.  Impression: No acute findings.    Electronically signed by: Luis E Sequeira  Date:    01/04/2024  Time:    14:51  X-Ray Chest PA And Lateral  Narrative: EXAMINATION:  XR CHEST PA AND LATERAL; XR RIBS 2 VIEW LEFT    CLINICAL HISTORY:  Low back pain, unspecified    TECHNIQUE:  PA and lateral views of the chest were performed.  Two views of the left ribs also performed.    COMPARISON:  10/23/2023    FINDINGS:  Heart size normal.  Lungs clear.  No pneumothorax or pleural effusion.  Degenerative changes of the left glenohumeral acromioclavicular joint with osteophyte formation.  There is no rib fracture detected.  Impression: No acute findings.    Electronically signed by: Luis E Sequeira  Date:    01/04/2024  Time:    14:51         **Labs and x-rays personally reviewed by me    ** reviewed      Objective:        Assessment:       1. Left low back pain, unspecified chronicity, unspecified whether sciatica present  Basic Metabolic Panel    X-Ray Chest PA And Lateral    X-Ray KUB    X-Ray Ribs 2 View Left      2. Obesity, unspecified classification, unspecified obesity type, unspecified whether serious comorbidity  present        3. Left sided abdominal pain             Plan:         1. Left low back pain, unspecified chronicity, unspecified whether sciatica present  -     Basic Metabolic Panel; Future; Expected date: 01/04/2024  -     X-Ray Chest PA And Lateral; Future; Expected date: 01/04/2024  -     X-Ray KUB; Future; Expected date: 01/04/2024  -     X-Ray Ribs 2 View Left; Future; Expected date: 01/04/2024    2. Obesity, unspecified classification, unspecified obesity type, unspecified whether serious comorbidity present    3. Left sided abdominal pain    Other orders  -     naproxen (NAPROSYN) 500 MG tablet; Take 1 tablet (500 mg total) by mouth 2 (two) times a day.  Dispense: 30 tablet; Refill: 0

## 2024-01-22 ENCOUNTER — OFFICE VISIT (OUTPATIENT)
Dept: FAMILY MEDICINE | Facility: CLINIC | Age: 59
End: 2024-01-22

## 2024-01-22 VITALS
RESPIRATION RATE: 18 BRPM | BODY MASS INDEX: 37.38 KG/M2 | HEART RATE: 94 BPM | DIASTOLIC BLOOD PRESSURE: 82 MMHG | HEIGHT: 72 IN | OXYGEN SATURATION: 98 % | SYSTOLIC BLOOD PRESSURE: 132 MMHG | TEMPERATURE: 98 F | WEIGHT: 276 LBS

## 2024-01-22 DIAGNOSIS — K59.00 CONSTIPATION, UNSPECIFIED CONSTIPATION TYPE: Primary | ICD-10-CM

## 2024-01-22 DIAGNOSIS — E66.9 CLASS 2 OBESITY WITH BODY MASS INDEX (BMI) OF 37.0 TO 37.9 IN ADULT, UNSPECIFIED OBESITY TYPE, UNSPECIFIED WHETHER SERIOUS COMORBIDITY PRESENT: ICD-10-CM

## 2024-01-22 PROCEDURE — 99214 OFFICE O/P EST MOD 30 MIN: CPT | Mod: ,,, | Performed by: INTERNAL MEDICINE

## 2024-01-22 RX ORDER — LACTULOSE 10 G/15ML
20 SOLUTION ORAL 2 TIMES DAILY
Qty: 500 ML | Refills: 0 | Status: SHIPPED | OUTPATIENT
Start: 2024-01-22

## 2024-01-24 PROBLEM — K59.00 CONSTIPATION: Status: ACTIVE | Noted: 2024-01-24

## 2024-01-24 NOTE — PROGRESS NOTES
Subjective:       Patient ID: Juan C Carranza is a 58 y.o. male.    Chief Complaint: Back Pain (Left )    Back Pain  Pertinent negatives include no abdominal pain, chest pain or dysuria.   Patient complains of moderate back pain complains of worsening constipation with the patient stated that he is going to need something better for the constipation.  I am going to start lactulose 30 cc p.o. b.i.d. p.r.n. constipation  Patient has chronic obesity a recommend low carb diet exercise lifestyle modification  .    Current Medications:    Current Outpatient Medications:     aspirin (ECOTRIN) 81 MG EC tablet, Take 81 mg by mouth once daily., Disp: , Rfl:     atorvastatin (LIPITOR) 20 MG tablet, Take 1 tablet (20 mg total) by mouth once daily., Disp: 90 tablet, Rfl: 1    baloxavir marboxiL (XOFLUZA) 40 mg tablet, Take 40 mg by mouth. TAKE 2 TABS DAILY, Disp: , Rfl:     carvediloL (COREG) 25 MG tablet, Take 1 tablet (25 mg total) by mouth 2 (two) times daily with meals., Disp: 180 tablet, Rfl: 1    cetirizine (ZYRTEC) 10 MG tablet, Take 10 mg by mouth once daily., Disp: , Rfl:     chlorthalidone (HYGROTEN) 25 MG Tab, TAKE 1 TABLET (25 MG TOTAL) BY MOUTH ONCE DAILY., Disp: 90 tablet, Rfl: 1    cholecalciferol, vitamin D3, 1,250 mcg (50,000 unit) capsule, Take 50,000 Units by mouth once a week., Disp: , Rfl:     famotidine (PEPCID) 20 MG tablet, Take 20 mg by mouth 2 (two) times daily., Disp: , Rfl:     gabapentin (NEURONTIN) 300 MG capsule, Take 1 capsule (300 mg total) by mouth nightly as needed., Disp: 30 capsule, Rfl: 2    lisinopriL (PRINIVIL,ZESTRIL) 20 MG tablet, Take 1 tablet (20 mg total) by mouth once daily., Disp: 90 tablet, Rfl: 1    naproxen (NAPROSYN) 500 MG tablet, Take 1 tablet (500 mg total) by mouth 2 (two) times a day., Disp: 30 tablet, Rfl: 0    sildenafiL (VIAGRA) 100 MG tablet, Take 1 tablet (100 mg total) by mouth daily as needed for Erectile Dysfunction., Disp: 12 tablet, Rfl: 2     sulfamethoxazole-trimethoprim 800-160mg (BACTRIM DS) 800-160 mg Tab, Take 1 tablet by mouth 2 (two) times daily., Disp: 20 tablet, Rfl: 0    tamsulosin (FLOMAX) 0.4 mg Cap, Take 1 capsule (0.4 mg total) by mouth every evening., Disp: 90 capsule, Rfl: 1    lactulose (CHRONULAC) 20 gram/30 mL Soln, Take 30 mLs (20 g total) by mouth 2 (two) times daily., Disp: 500 mL, Rfl: 0           Review of Systems   Constitutional:  Negative for appetite change and fatigue.   HENT:  Negative for nasal congestion and rhinorrhea.    Eyes:  Negative for redness and visual disturbance.   Respiratory:  Negative for cough and shortness of breath.    Cardiovascular:  Negative for chest pain and leg swelling.   Gastrointestinal:  Positive for constipation. Negative for abdominal pain and diarrhea.   Endocrine: Negative for polyuria.   Genitourinary:  Negative for difficulty urinating, dysuria and erectile dysfunction.   Musculoskeletal:  Positive for back pain. Negative for arthralgias and myalgias.   Integumentary:  Negative for rash and mole/lesion.   All other systems reviewed and are negative.               Vitals:    01/22/24 1125 01/22/24 1220   BP: (!) 144/94 132/82   BP Location: Right arm    Patient Position: Sitting    BP Method: Large (Automatic)    Pulse: 94    Resp: 18    Temp: 97.5 °F (36.4 °C)    TempSrc: Temporal    SpO2: 98%    Weight: 125.2 kg (276 lb)    Height: 6' (1.829 m)         Physical Exam  Vitals and nursing note reviewed.   Constitutional:       Appearance: Normal appearance. He is obese.   Cardiovascular:      Rate and Rhythm: Normal rate and regular rhythm.      Pulses: Normal pulses.      Heart sounds: Normal heart sounds.   Pulmonary:      Effort: Pulmonary effort is normal.      Breath sounds: Normal breath sounds.   Abdominal:      General: Abdomen is flat. Bowel sounds are normal.      Palpations: Abdomen is soft.   Musculoskeletal:         General: Normal range of motion.   Skin:     General: Skin is  warm and dry.   Neurological:      General: No focal deficit present.      Mental Status: He is alert and oriented to person, place, and time. Mental status is at baseline.           Last Labs:     No visits with results within 1 Month(s) from this visit.   Latest known visit with results is:   Office Visit on 05/24/2023   Component Date Value    HIV 1/2 05/24/2023 Non-Reactive     Hepatitis C Ab 05/24/2023 Non-Reactive        Last Imaging:  X-Ray KUB  Narrative: EXAMINATION:  XR KUB    CLINICAL HISTORY:  Low back pain, unspecified    TECHNIQUE:  Single AP supine view of the abdomen (KUB) was performed    COMPARISON:  10/23/2023    FINDINGS:  Mild colonic stool.  No bowel obstruction.  No abnormal calcification.  Degenerative changes of the spine and hips similar.  Impression: Mild colonic stool.  Degenerative changes of the spine and hips.    Electronically signed by: Luis E Sequeira  Date:    01/04/2024  Time:    14:51  X-Ray Ribs 2 View Left  Narrative: EXAMINATION:  XR CHEST PA AND LATERAL; XR RIBS 2 VIEW LEFT    CLINICAL HISTORY:  Low back pain, unspecified    TECHNIQUE:  PA and lateral views of the chest were performed.  Two views of the left ribs also performed.    COMPARISON:  10/23/2023    FINDINGS:  Heart size normal.  Lungs clear.  No pneumothorax or pleural effusion.  Degenerative changes of the left glenohumeral acromioclavicular joint with osteophyte formation.  There is no rib fracture detected.  Impression: No acute findings.    Electronically signed by: Luis E Sequeira  Date:    01/04/2024  Time:    14:51  X-Ray Chest PA And Lateral  Narrative: EXAMINATION:  XR CHEST PA AND LATERAL; XR RIBS 2 VIEW LEFT    CLINICAL HISTORY:  Low back pain, unspecified    TECHNIQUE:  PA and lateral views of the chest were performed.  Two views of the left ribs also performed.    COMPARISON:  10/23/2023    FINDINGS:  Heart size normal.  Lungs clear.  No pneumothorax or pleural effusion.  Degenerative changes of the left  glenohumeral acromioclavicular joint with osteophyte formation.  There is no rib fracture detected.  Impression: No acute findings.    Electronically signed by: Luis E Sequeira  Date:    01/04/2024  Time:    14:51         **Labs and x-rays personally reviewed by me    ** reviewed      Objective:        Assessment:       1. Constipation, unspecified constipation type        2. Class 2 obesity with body mass index (BMI) of 37.0 to 37.9 in adult, unspecified obesity type, unspecified whether serious comorbidity present             Plan:         1. Constipation, unspecified constipation type    2. Class 2 obesity with body mass index (BMI) of 37.0 to 37.9 in adult, unspecified obesity type, unspecified whether serious comorbidity present    Other orders  -     lactulose (CHRONULAC) 20 gram/30 mL Soln; Take 30 mLs (20 g total) by mouth 2 (two) times daily.  Dispense: 500 mL; Refill: 0

## 2024-03-26 DIAGNOSIS — Z12.11 SCREENING FOR COLON CANCER: Primary | ICD-10-CM

## 2024-03-26 RX ORDER — POLYETHYLENE GLYCOL 3350, SODIUM SULFATE ANHYDROUS, SODIUM BICARBONATE, SODIUM CHLORIDE, POTASSIUM CHLORIDE 236; 22.74; 6.74; 5.86; 2.97 G/4L; G/4L; G/4L; G/4L; G/4L
4 POWDER, FOR SOLUTION ORAL ONCE
Qty: 4000 ML | Refills: 0 | Status: SHIPPED | OUTPATIENT
Start: 2024-03-26 | End: 2024-03-26

## 2024-04-01 ENCOUNTER — HOSPITAL ENCOUNTER (OUTPATIENT)
Dept: GASTROENTEROLOGY | Facility: HOSPITAL | Age: 59
Discharge: HOME | End: 2024-04-01
Attending: INTERNAL MEDICINE | Admitting: INTERNAL MEDICINE

## 2024-04-01 ENCOUNTER — ANESTHESIA (OUTPATIENT)
Dept: GASTROENTEROLOGY | Facility: HOSPITAL | Age: 59
End: 2024-04-01

## 2024-04-01 ENCOUNTER — ANESTHESIA EVENT (OUTPATIENT)
Dept: GASTROENTEROLOGY | Facility: HOSPITAL | Age: 59
End: 2024-04-01

## 2024-04-01 VITALS
TEMPERATURE: 98 F | HEART RATE: 65 BPM | RESPIRATION RATE: 22 BRPM | SYSTOLIC BLOOD PRESSURE: 95 MMHG | DIASTOLIC BLOOD PRESSURE: 58 MMHG | OXYGEN SATURATION: 95 %

## 2024-04-01 DIAGNOSIS — Z12.11 SCREENING FOR COLON CANCER: ICD-10-CM

## 2024-04-01 PROCEDURE — 37000008 HC ANESTHESIA 1ST 15 MINUTES

## 2024-04-01 PROCEDURE — D9220A PRA ANESTHESIA: Mod: 33,,,

## 2024-04-01 PROCEDURE — 45385 COLONOSCOPY W/LESION REMOVAL: CPT | Mod: PT | Performed by: INTERNAL MEDICINE

## 2024-04-01 PROCEDURE — 45385 COLONOSCOPY W/LESION REMOVAL: CPT | Mod: 33,,, | Performed by: INTERNAL MEDICINE

## 2024-04-01 PROCEDURE — 88305 TISSUE EXAM BY PATHOLOGIST: CPT | Mod: 26,,, | Performed by: PATHOLOGY

## 2024-04-01 PROCEDURE — 88305 TISSUE EXAM BY PATHOLOGIST: CPT | Mod: TC,SUR | Performed by: INTERNAL MEDICINE

## 2024-04-01 PROCEDURE — 37000009 HC ANESTHESIA EA ADD 15 MINS

## 2024-04-01 PROCEDURE — 63600175 PHARM REV CODE 636 W HCPCS

## 2024-04-01 PROCEDURE — 25000003 PHARM REV CODE 250

## 2024-04-01 PROCEDURE — 45380 COLONOSCOPY AND BIOPSY: CPT | Mod: 33,59,, | Performed by: INTERNAL MEDICINE

## 2024-04-01 PROCEDURE — 27201423 OPTIME MED/SURG SUP & DEVICES STERILE SUPPLY

## 2024-04-01 PROCEDURE — 45380 COLONOSCOPY AND BIOPSY: CPT | Mod: PT,59 | Performed by: INTERNAL MEDICINE

## 2024-04-01 RX ORDER — PROPOFOL 10 MG/ML
VIAL (ML) INTRAVENOUS
Status: DISCONTINUED | OUTPATIENT
Start: 2024-04-01 | End: 2024-04-01

## 2024-04-01 RX ORDER — PHENYLEPHRINE HYDROCHLORIDE 10 MG/ML
INJECTION INTRAVENOUS
Status: DISCONTINUED | OUTPATIENT
Start: 2024-04-01 | End: 2024-04-01

## 2024-04-01 RX ORDER — SODIUM CHLORIDE 9 MG/ML
INJECTION, SOLUTION INTRAVENOUS CONTINUOUS PRN
Status: DISCONTINUED | OUTPATIENT
Start: 2024-04-01 | End: 2024-04-01

## 2024-04-01 RX ORDER — LIDOCAINE HYDROCHLORIDE 20 MG/ML
INJECTION, SOLUTION EPIDURAL; INFILTRATION; INTRACAUDAL; PERINEURAL
Status: DISCONTINUED | OUTPATIENT
Start: 2024-04-01 | End: 2024-04-01

## 2024-04-01 RX ORDER — SODIUM CHLORIDE 0.9 % (FLUSH) 0.9 %
10 SYRINGE (ML) INJECTION
Status: DISCONTINUED | OUTPATIENT
Start: 2024-04-01 | End: 2024-04-02 | Stop reason: HOSPADM

## 2024-04-01 RX ADMIN — PROPOFOL 40 MG: 10 INJECTION, EMULSION INTRAVENOUS at 11:04

## 2024-04-01 RX ADMIN — PHENYLEPHRINE HYDROCHLORIDE 100 MCG: 10 INJECTION INTRAVENOUS at 11:04

## 2024-04-01 RX ADMIN — LIDOCAINE HYDROCHLORIDE 100 MG: 20 INJECTION, SOLUTION INTRAVENOUS at 11:04

## 2024-04-01 RX ADMIN — SODIUM CHLORIDE: 9 INJECTION, SOLUTION INTRAVENOUS at 11:04

## 2024-04-01 RX ADMIN — PROPOFOL 150 MG: 10 INJECTION, EMULSION INTRAVENOUS at 11:04

## 2024-04-01 NOTE — ANESTHESIA POSTPROCEDURE EVALUATION
Anesthesia Post Evaluation    Patient: Juan C Carranza    Procedure(s) Performed: colonoscopy    Final Anesthesia Type: general      Patient location during evaluation: GI PACU  Patient participation: Yes- Able to Participate  Level of consciousness: awake and alert  Post-procedure vital signs: reviewed and stable  Pain management: adequate  Airway patency: patent    PONV status at discharge: No PONV  Anesthetic complications: no      Cardiovascular status: blood pressure returned to baseline  Respiratory status: unassisted and spontaneous ventilation  Hydration status: euvolemic  Follow-up not needed.  Comments: Refer to nursing note for pain and jaime score upon discharge from recovery              Vitals Value Taken Time   BP 95/58 04/01/24 1225   Temp 36.7 °C (98.1 °F) 04/01/24 1143   Pulse 65 04/01/24 1225   Resp 22 04/01/24 1225   SpO2 95 % 04/01/24 1225         Event Time   Out of Recovery 12:26:16         Pain/Jaime Score: No data recorded

## 2024-04-01 NOTE — TRANSFER OF CARE
Anesthesia Transfer of Care Note    Patient: Juan C OLGUIN Dillon    Procedure(s) Performed: colonoscopy    Patient location: GI    Anesthesia Type: general and MAC    Transport from OR: Transported from OR on room air with adequate spontaneous ventilation    Post pain: adequate analgesia    Post assessment: no apparent anesthetic complications    Post vital signs: stable    Level of consciousness: awake, alert and oriented    Nausea/Vomiting: no nausea/vomiting    Complications: none    Transfer of care protocol was followedComments: Refer to nursing note for pain nisha score upon discharge from recovery      Last vitals: Visit Vitals  BP (!) 97/52   Pulse 70   Temp 36.7 °C (98.1 °F)   Resp 16   SpO2 99%

## 2024-04-01 NOTE — ANESTHESIA PREPROCEDURE EVALUATION
04/01/2024  Juan C Carranza is a 58 y.o., male.      Pre-op Assessment    I have reviewed the Patient Summary Reports.     I have reviewed the Nursing Notes. I have reviewed the NPO Status.   I have reviewed the Medications.     Review of Systems  Anesthesia Hx:  No problems with previous Anesthesia                Social:  Alcohol Use       Cardiovascular:     Hypertension           hyperlipidemia    cardiomegaly                         Musculoskeletal:  Arthritis               Neurological:    Neuromuscular Disease,                                   Endocrine:        Obesity / BMI > 30      Physical Exam  General: Well nourished, Cooperative, Alert and Oriented    Airway:  Mallampati: II   Mouth Opening: Normal  TM Distance: Normal  Tongue: Normal  Neck ROM: Normal ROM    Dental:  Intact    Chest/Lungs:  Clear to auscultation, Normal Respiratory Rate    Heart:  Rate: Normal  Rhythm: Regular Rhythm  Sounds: Normal    Abdomen:  Normal, Soft, Nontender        Anesthesia Plan  Type of Anesthesia, risks & benefits discussed:    Anesthesia Type: Gen Natural Airway, MAC  Intra-op Monitoring Plan: Standard ASA Monitors  Post Op Pain Control Plan: multimodal analgesia and IV/PO Opioids PRN  Induction:  IV  Informed Consent: Informed consent signed with the Patient and all parties understand the risks and agree with anesthesia plan.  All questions answered.   ASA Score: 3  Day of Surgery Review of History & Physical: I have interviewed and examined the patient. I have reviewed the patient's H&P dated:     Ready For Surgery From Anesthesia Perspective.     .

## 2024-04-01 NOTE — DISCHARGE INSTRUCTIONS
Procedure Date  4/1/24     Impression  Overall Impression:   4 polyps in the transverse colon and sigmoid colon were removed with cold forceps biopsy and cold snare  Mild abnormal mucosa in the ileocecal valve; performed cold forceps biopsy  Scattered diverticulosis of mild severity  The cecum appeared normal.  (grade 2) hemorrhoids        Recommendation    Await pathology results  Will follow up biopsy from ICV - if adenomatous, will discuss plan at that time     Repeat colonoscopy in 3 years      Outcome of procedure: successful Colonoscopy  Disposition: patient to recovery following procedure; discharge to home when appropriate parameters met  Provisions for follow up: please call my office for any unexpected symptoms like chest or abdominal pain or bleeding following your procedure.  Final Diagnosis: colon polyps     NO driving today, no operating heavy machinery, no signing any legal documents until tomorrow.     Drink lots of fluids, resume regular diet.  Take your normal medications.     Please call our office for any nausea, vomiting, or abdominal pain.     THE NURSE WILL CALL YOU WITH YOUR BIOPSY RESULTS IN A FEW DAYS. IF YOU HAVE  [facility] MYCHART YOUR RESULTS WILL APPEAR THERE AS WELL.

## 2024-04-01 NOTE — H&P
Gastroenterology Pre-procedure H&P    History of Present Illness    Juan C Carranza is a 58 y.o. male that  has a past medical history of Cardiomegaly, Hyperlipidemia, Hypertension, and Palpitations.     Patient here for routine screening     Patient denies wt loss, abdominal pain, diarrhea, melena/hematochezia, change in stool caliber, no anticoagulants, FMHx of GI related malignancies.      Past Medical History:   Diagnosis Date    Cardiomegaly     Hyperlipidemia     Hypertension     Palpitations        No past surgical history on file.    Family History   Family history unknown: Yes       Social History     Socioeconomic History    Marital status:    Tobacco Use    Smoking status: Never    Smokeless tobacco: Never   Substance and Sexual Activity    Alcohol use: Yes       Current Outpatient Medications   Medication Sig Dispense Refill    aspirin (ECOTRIN) 81 MG EC tablet Take 81 mg by mouth once daily.      atorvastatin (LIPITOR) 20 MG tablet Take 1 tablet (20 mg total) by mouth once daily. 90 tablet 1    baloxavir marboxiL (XOFLUZA) 40 mg tablet Take 40 mg by mouth. TAKE 2 TABS DAILY      carvediloL (COREG) 25 MG tablet Take 1 tablet (25 mg total) by mouth 2 (two) times daily with meals. 180 tablet 1    cetirizine (ZYRTEC) 10 MG tablet Take 10 mg by mouth once daily.      chlorthalidone (HYGROTEN) 25 MG Tab TAKE 1 TABLET (25 MG TOTAL) BY MOUTH ONCE DAILY. 90 tablet 1    cholecalciferol, vitamin D3, 1,250 mcg (50,000 unit) capsule Take 50,000 Units by mouth once a week.      famotidine (PEPCID) 20 MG tablet Take 20 mg by mouth 2 (two) times daily.      gabapentin (NEURONTIN) 300 MG capsule Take 1 capsule (300 mg total) by mouth nightly as needed. 30 capsule 2    lactulose (CHRONULAC) 20 gram/30 mL Soln Take 30 mLs (20 g total) by mouth 2 (two) times daily. 500 mL 0    lisinopriL (PRINIVIL,ZESTRIL) 20 MG tablet Take 1 tablet (20 mg total) by mouth once daily. 90 tablet 1    naproxen (NAPROSYN) 500 MG tablet  Take 1 tablet (500 mg total) by mouth 2 (two) times a day. 30 tablet 0    sildenafiL (VIAGRA) 100 MG tablet Take 1 tablet (100 mg total) by mouth daily as needed for Erectile Dysfunction. 12 tablet 2    sulfamethoxazole-trimethoprim 800-160mg (BACTRIM DS) 800-160 mg Tab Take 1 tablet by mouth 2 (two) times daily. 20 tablet 0    tamsulosin (FLOMAX) 0.4 mg Cap Take 1 capsule (0.4 mg total) by mouth every evening. 90 capsule 1     No current facility-administered medications for this encounter.       Review of patient's allergies indicates:  No Known Allergies    Objective:  There were no vitals filed for this visit.     GEN: normal appearing, NAD, AAO x3  HENT: NCAT, anicteric, OP benign  CV: normal rate, regular rhythm  RESP: NABS, symmetric rise, unlabored  ABD: soft, ND, no guarding or TTP  SKIN: warm and dry  NEURO: grossly afocal    Assessment and Plan:    Proceed with:    Colonoscopy for screening for colon cancer    Felipe Polk MD  Gastroenterology

## 2024-04-02 LAB
ESTROGEN SERPL-MCNC: NORMAL PG/ML
INSULIN SERPL-ACNC: NORMAL U[IU]/ML
LAB AP GROSS DESCRIPTION: NORMAL
LAB AP LABORATORY NOTES: NORMAL
T3RU NFR SERPL: NORMAL %

## 2024-04-03 NOTE — PROGRESS NOTES
Dr. Martinez, thank you for referring this patient to me. I recommend repeat colonoscopy in 3 years. Please let me know if you have any questions regarding this patient.

## 2024-06-11 RX ORDER — ATORVASTATIN CALCIUM 20 MG/1
20 TABLET, FILM COATED ORAL DAILY
Qty: 90 TABLET | Refills: 1 | Status: SHIPPED | OUTPATIENT
Start: 2024-06-11

## 2024-06-11 RX ORDER — CARVEDILOL 25 MG/1
25 TABLET ORAL 2 TIMES DAILY WITH MEALS
Qty: 180 TABLET | Refills: 1 | Status: SHIPPED | OUTPATIENT
Start: 2024-06-11

## 2024-07-02 ENCOUNTER — OFFICE VISIT (OUTPATIENT)
Dept: FAMILY MEDICINE | Facility: CLINIC | Age: 59
End: 2024-07-02
Payer: COMMERCIAL

## 2024-07-02 VITALS
RESPIRATION RATE: 18 BRPM | WEIGHT: 292 LBS | HEART RATE: 84 BPM | DIASTOLIC BLOOD PRESSURE: 99 MMHG | BODY MASS INDEX: 39.55 KG/M2 | OXYGEN SATURATION: 98 % | TEMPERATURE: 98 F | HEIGHT: 72 IN | SYSTOLIC BLOOD PRESSURE: 145 MMHG

## 2024-07-02 DIAGNOSIS — N40.0 BENIGN PROSTATIC HYPERPLASIA, UNSPECIFIED WHETHER LOWER URINARY TRACT SYMPTOMS PRESENT: ICD-10-CM

## 2024-07-02 DIAGNOSIS — I10 ESSENTIAL HYPERTENSION: Primary | ICD-10-CM

## 2024-07-02 DIAGNOSIS — E78.5 DYSLIPIDEMIA: ICD-10-CM

## 2024-07-02 LAB
ALBUMIN SERPL BCP-MCNC: 3.2 G/DL (ref 3.5–5)
ALP SERPL-CCNC: 90 U/L (ref 45–115)
ALT SERPL W P-5'-P-CCNC: 74 U/L (ref 16–61)
ANION GAP SERPL CALCULATED.3IONS-SCNC: 12 MMOL/L (ref 7–16)
AST SERPL W P-5'-P-CCNC: 67 U/L (ref 15–37)
BASOPHILS # BLD AUTO: 0.02 K/UL (ref 0–0.2)
BASOPHILS NFR BLD AUTO: 0.4 % (ref 0–1)
BILIRUB DIRECT SERPL-MCNC: 0.1 MG/DL (ref 0–0.2)
BILIRUB SERPL-MCNC: 0.4 MG/DL (ref ?–1.2)
BUN SERPL-MCNC: 17 MG/DL (ref 7–18)
BUN/CREAT SERPL: 18 (ref 6–20)
CALCIUM SERPL-MCNC: 8.5 MG/DL (ref 8.5–10.1)
CHLORIDE SERPL-SCNC: 107 MMOL/L (ref 98–107)
CHOLEST SERPL-MCNC: 136 MG/DL (ref 0–200)
CHOLEST/HDLC SERPL: 3.6 {RATIO}
CO2 SERPL-SCNC: 25 MMOL/L (ref 21–32)
CREAT SERPL-MCNC: 0.93 MG/DL (ref 0.7–1.3)
DIFFERENTIAL METHOD BLD: ABNORMAL
EGFR (NO RACE VARIABLE) (RUSH/TITUS): 95 ML/MIN/1.73M2
EOSINOPHIL # BLD AUTO: 0.23 K/UL (ref 0–0.5)
EOSINOPHIL NFR BLD AUTO: 4.5 % (ref 1–4)
ERYTHROCYTE [DISTWIDTH] IN BLOOD BY AUTOMATED COUNT: 13.5 % (ref 11.5–14.5)
EST. AVERAGE GLUCOSE BLD GHB EST-MCNC: 111 MG/DL
GLUCOSE SERPL-MCNC: 99 MG/DL (ref 74–106)
HBA1C MFR BLD HPLC: 5.5 % (ref 4.5–6.6)
HCT VFR BLD AUTO: 45.2 % (ref 40–54)
HDLC SERPL-MCNC: 38 MG/DL (ref 40–60)
HGB BLD-MCNC: 14.9 G/DL (ref 13.5–18)
IMM GRANULOCYTES # BLD AUTO: 0.02 K/UL (ref 0–0.04)
IMM GRANULOCYTES NFR BLD: 0.4 % (ref 0–0.4)
LDLC SERPL CALC-MCNC: 35 MG/DL
LYMPHOCYTES # BLD AUTO: 1.79 K/UL (ref 1–4.8)
LYMPHOCYTES NFR BLD AUTO: 34.9 % (ref 27–41)
MCH RBC QN AUTO: 31.6 PG (ref 27–31)
MCHC RBC AUTO-ENTMCNC: 33 G/DL (ref 32–36)
MCV RBC AUTO: 95.8 FL (ref 80–96)
MONOCYTES # BLD AUTO: 0.5 K/UL (ref 0–0.8)
MONOCYTES NFR BLD AUTO: 9.7 % (ref 2–6)
MPC BLD CALC-MCNC: 12.2 FL (ref 9.4–12.4)
NEUTROPHILS # BLD AUTO: 2.57 K/UL (ref 1.8–7.7)
NEUTROPHILS NFR BLD AUTO: 50.1 % (ref 53–65)
NONHDLC SERPL-MCNC: 98 MG/DL
NRBC # BLD AUTO: 0 X10E3/UL
NRBC, AUTO (.00): 0 %
PLATELET # BLD AUTO: 211 K/UL (ref 150–400)
POTASSIUM SERPL-SCNC: 3.2 MMOL/L (ref 3.5–5.1)
PROT SERPL-MCNC: 7.7 G/DL (ref 6.4–8.2)
PSA SERPL-MCNC: 0.96 NG/ML
RBC # BLD AUTO: 4.72 M/UL (ref 4.6–6.2)
SODIUM SERPL-SCNC: 141 MMOL/L (ref 136–145)
TRIGL SERPL-MCNC: 316 MG/DL (ref 35–150)
VLDLC SERPL-MCNC: 63 MG/DL
WBC # BLD AUTO: 5.13 K/UL (ref 4.5–11)

## 2024-07-02 PROCEDURE — 85025 COMPLETE CBC W/AUTO DIFF WBC: CPT | Mod: ,,, | Performed by: CLINICAL MEDICAL LABORATORY

## 2024-07-02 PROCEDURE — G0103 PSA SCREENING: HCPCS | Mod: ,,, | Performed by: CLINICAL MEDICAL LABORATORY

## 2024-07-02 PROCEDURE — 80061 LIPID PANEL: CPT | Mod: ,,, | Performed by: CLINICAL MEDICAL LABORATORY

## 2024-07-02 PROCEDURE — 80076 HEPATIC FUNCTION PANEL: CPT | Mod: ,,, | Performed by: CLINICAL MEDICAL LABORATORY

## 2024-07-02 PROCEDURE — 3008F BODY MASS INDEX DOCD: CPT | Mod: CPTII,,, | Performed by: INTERNAL MEDICINE

## 2024-07-02 PROCEDURE — 80048 BASIC METABOLIC PNL TOTAL CA: CPT | Mod: ,,, | Performed by: CLINICAL MEDICAL LABORATORY

## 2024-07-02 PROCEDURE — 99214 OFFICE O/P EST MOD 30 MIN: CPT | Mod: ,,, | Performed by: INTERNAL MEDICINE

## 2024-07-02 PROCEDURE — 3080F DIAST BP >= 90 MM HG: CPT | Mod: CPTII,,, | Performed by: INTERNAL MEDICINE

## 2024-07-02 PROCEDURE — 3077F SYST BP >= 140 MM HG: CPT | Mod: CPTII,,, | Performed by: INTERNAL MEDICINE

## 2024-07-02 PROCEDURE — 1159F MED LIST DOCD IN RCRD: CPT | Mod: CPTII,,, | Performed by: INTERNAL MEDICINE

## 2024-07-02 PROCEDURE — 83036 HEMOGLOBIN GLYCOSYLATED A1C: CPT | Mod: ,,, | Performed by: CLINICAL MEDICAL LABORATORY

## 2024-07-02 PROCEDURE — 3044F HG A1C LEVEL LT 7.0%: CPT | Mod: CPTII,,, | Performed by: INTERNAL MEDICINE

## 2024-07-02 PROCEDURE — 4010F ACE/ARB THERAPY RXD/TAKEN: CPT | Mod: CPTII,,, | Performed by: INTERNAL MEDICINE

## 2024-07-03 ENCOUNTER — TELEPHONE (OUTPATIENT)
Dept: FAMILY MEDICINE | Facility: CLINIC | Age: 59
End: 2024-07-03
Payer: COMMERCIAL

## 2024-07-03 RX ORDER — TAMSULOSIN HYDROCHLORIDE 0.4 MG/1
0.4 CAPSULE ORAL NIGHTLY
Qty: 90 CAPSULE | Refills: 1 | Status: SHIPPED | OUTPATIENT
Start: 2024-07-03

## 2024-07-03 RX ORDER — CHLORTHALIDONE 25 MG/1
25 TABLET ORAL DAILY
Qty: 90 TABLET | Refills: 1 | Status: SHIPPED | OUTPATIENT
Start: 2024-07-03

## 2024-07-03 RX ORDER — LISINOPRIL 20 MG/1
20 TABLET ORAL DAILY
Qty: 90 TABLET | Refills: 1 | Status: SHIPPED | OUTPATIENT
Start: 2024-07-03

## 2024-07-03 RX ORDER — ATORVASTATIN CALCIUM 20 MG/1
20 TABLET, FILM COATED ORAL DAILY
Qty: 90 TABLET | Refills: 1 | Status: SHIPPED | OUTPATIENT
Start: 2024-07-03

## 2024-07-03 RX ORDER — CARVEDILOL 25 MG/1
25 TABLET ORAL 2 TIMES DAILY WITH MEALS
Qty: 180 TABLET | Refills: 1 | Status: SHIPPED | OUTPATIENT
Start: 2024-07-03

## 2024-07-03 RX ORDER — GABAPENTIN 300 MG/1
300 CAPSULE ORAL NIGHTLY PRN
Qty: 30 CAPSULE | Refills: 2 | Status: SHIPPED | OUTPATIENT
Start: 2024-07-03

## 2024-07-03 NOTE — TELEPHONE ENCOUNTER
----- Message from Burt Martinez MD sent at 7/3/2024 12:15 PM CDT -----  Need to see in  1 week please  abnl results     1428 Pt is scheduled to come in on 07/17/24

## 2024-07-10 PROBLEM — E78.5 DYSLIPIDEMIA: Status: ACTIVE | Noted: 2024-07-10

## 2024-07-10 PROBLEM — N40.0 BENIGN PROSTATIC HYPERPLASIA: Status: ACTIVE | Noted: 2024-07-10

## 2024-07-10 NOTE — PROGRESS NOTES
Subjective:       Patient ID: Juan C Carranza is a 59 y.o. male.    Chief Complaint: Constipation (Follow up visit for constipation ), Medication Refill, and Health Maintenance (Pt refused Covid vaccine on today's visit )    HPI  .  Patient blood pressure is elevated patient stated he ran out of medications patient has chronic BPH chronic neuropathy patient wants his prostate level checked today.  I will check baseline labs in general.  Also will check A1c lipids today his past labs have been reviewed in his discussed with him.    Current Medications:    Current Outpatient Medications:     aspirin (ECOTRIN) 81 MG EC tablet, Take 81 mg by mouth once daily., Disp: , Rfl:     baloxavir marboxiL (XOFLUZA) 40 mg tablet, Take 40 mg by mouth. TAKE 2 TABS DAILY, Disp: , Rfl:     cetirizine (ZYRTEC) 10 MG tablet, Take 10 mg by mouth once daily., Disp: , Rfl:     cholecalciferol, vitamin D3, 1,250 mcg (50,000 unit) capsule, Take 50,000 Units by mouth once a week., Disp: , Rfl:     famotidine (PEPCID) 20 MG tablet, Take 20 mg by mouth 2 (two) times daily., Disp: , Rfl:     lactulose (CHRONULAC) 20 gram/30 mL Soln, Take 30 mLs (20 g total) by mouth 2 (two) times daily., Disp: 500 mL, Rfl: 0    naproxen (NAPROSYN) 500 MG tablet, Take 1 tablet (500 mg total) by mouth 2 (two) times a day., Disp: 30 tablet, Rfl: 0    sildenafiL (VIAGRA) 100 MG tablet, Take 1 tablet (100 mg total) by mouth daily as needed for Erectile Dysfunction., Disp: 12 tablet, Rfl: 2    sulfamethoxazole-trimethoprim 800-160mg (BACTRIM DS) 800-160 mg Tab, Take 1 tablet by mouth 2 (two) times daily., Disp: 20 tablet, Rfl: 0    atorvastatin (LIPITOR) 20 MG tablet, Take 1 tablet (20 mg total) by mouth once daily., Disp: 90 tablet, Rfl: 1    carvediloL (COREG) 25 MG tablet, Take 1 tablet (25 mg total) by mouth 2 (two) times daily with meals., Disp: 180 tablet, Rfl: 1    chlorthalidone (HYGROTEN) 25 MG Tab, Take 1 tablet (25 mg total) by mouth once daily., Disp: 90  tablet, Rfl: 1    gabapentin (NEURONTIN) 300 MG capsule, Take 1 capsule (300 mg total) by mouth nightly as needed (PRN)., Disp: 30 capsule, Rfl: 2    lisinopriL (PRINIVIL,ZESTRIL) 20 MG tablet, Take 1 tablet (20 mg total) by mouth once daily., Disp: 90 tablet, Rfl: 1    tamsulosin (FLOMAX) 0.4 mg Cap, Take 1 capsule (0.4 mg total) by mouth every evening., Disp: 90 capsule, Rfl: 1           ROS  Twelve point system reviewed, unremarkable except for stated above in HPI.        Objective:         Vitals:    07/02/24 1509 07/02/24 1510   BP: (!) 145/95 (!) 145/99   BP Location: Left arm    Patient Position: Sitting    BP Method: X-Large (Automatic)    Pulse: 84    Resp: 18    Temp: 97.7 °F (36.5 °C)    TempSrc: Temporal    SpO2: 98%    Weight: 132.5 kg (292 lb)    Height: 6' (1.829 m)         Physical Exam     Patient is awake alert oriented person place and  Lungs are clear to auscultation bilaterally no crackles or wheezes   Cardiovascular S1-S2 regular rate and rhythm no murmurs rubs or gallops   Abdomen is soft positive bowel sounds nontender, extremities no clubbing cyanosis edema  Neuro no focal neurological deficits  Skin warm and dry.     Last Labs:     Office Visit on 07/02/2024   Component Date Value    Sodium 07/02/2024 141     Potassium 07/02/2024 3.2 (L)     Chloride 07/02/2024 107     CO2 07/02/2024 25     Anion Gap 07/02/2024 12     Glucose 07/02/2024 99     BUN 07/02/2024 17     Creatinine 07/02/2024 0.93     BUN/Creatinine Ratio 07/02/2024 18     Calcium 07/02/2024 8.5     eGFR 07/02/2024 95     Total Protein 07/02/2024 7.7     Albumin 07/02/2024 3.2 (L)     Bilirubin, Total 07/02/2024 0.4     Bilirubin, Direct 07/02/2024 0.1     AST 07/02/2024 67 (H)     ALT 07/02/2024 74 (H)     Alk Phos 07/02/2024 90     PSA Total 07/02/2024 0.965     Hemoglobin A1C 07/02/2024 5.5     Estimated Average Glucose 07/02/2024 111     Triglycerides 07/02/2024 316 (H)     Cholesterol 07/02/2024 136     HDL Cholesterol  07/02/2024 38 (L)     Cholesterol/HDL Ratio (R* 07/02/2024 3.6     Non-HDL 07/02/2024 98     LDL Calculated 07/02/2024 35     VLDL 07/02/2024 63     WBC 07/02/2024 5.13     RBC 07/02/2024 4.72     Hemoglobin 07/02/2024 14.9     Hematocrit 07/02/2024 45.2     MCV 07/02/2024 95.8     MCH 07/02/2024 31.6 (H)     MCHC 07/02/2024 33.0     RDW 07/02/2024 13.5     Platelet Count 07/02/2024 211     MPV 07/02/2024 12.2     Neutrophils % 07/02/2024 50.1 (L)     Lymphocytes % 07/02/2024 34.9     Monocytes % 07/02/2024 9.7 (H)     Eosinophils % 07/02/2024 4.5 (H)     Basophils % 07/02/2024 0.4     Immature Granulocytes % 07/02/2024 0.4     nRBC, Auto 07/02/2024 0.0     Neutrophils, Abs 07/02/2024 2.57     Lymphocytes, Absolute 07/02/2024 1.79     Monocytes, Absolute 07/02/2024 0.50     Eosinophils, Absolute 07/02/2024 0.23     Basophils, Absolute 07/02/2024 0.02     Immature Granulocytes, A* 07/02/2024 0.02     nRBC, Absolute 07/02/2024 0.00     Diff Type 07/02/2024 Auto        Last Imaging:  Colonoscopy  Narrative: Table formatting from the original result was not included.  Procedure Date  4/1/24    Impression  Overall   Impression:    4 polyps in the transverse colon and sigmoid colon were removed with cold   forceps biopsy and cold snare  Mild abnormal mucosa in the ileocecal valve; performed cold forceps biopsy  Scattered diverticulosis of mild severity  The cecum appeared normal.  (grade 2) hemorrhoids    Recommendation   Await pathology results  Will follow up biopsy from ICV - if adenomatous, will discuss plan at that   time    Repeat colonoscopy in 3 years     Outcome of procedure: successful Colonoscopy  Disposition: patient to recovery following procedure; discharge to home   when appropriate parameters met  Provisions for follow up: please call my office for any unexpected   symptoms like chest or abdominal pain or bleeding following your   procedure.  Final Diagnosis: colon polyps     Indication  Screening for  colon cancer    Providers  Wyatt Lara, RN Registered Nurse   Keli Wei RN Registered Nurse   Jorge L Barrera CRNA CRNA Parker, Adam, MD Proceduralist   Rita Driscoll RN Technician     Medications  Moderate sedation administered by anesthesia staff - See anesthesia   record.    Preprocedure  A history and physical has been performed, and patient medication   allergies have been reviewed. The patient's tolerance of previous   anesthesia has been reviewed. The risks and benefits of the procedure and   the sedation options and risks were discussed with the patient. All   questions were answered and informed consent obtained.    ASA Score: ASA 3 - Patient with moderate systemic disease with functional   limitations  Mallampati Airway Score: II (hard and soft palate, upper portion of   tonsils anduvula visible)    Details of the Procedure  The patient underwent monitored anesthesia care, which was administered by   an anesthesia professional. The patient's heart rate, blood pressure,   level of consciousness, respirations, oxygen, ECG and ETCO2 were monitored   throughout the procedure. A digital rectal exam was performed. A perianal   exam was performed. The scope was introduced through the anus and advanced   to the cecum. Retroflexion was not performed due to endocuff. The quality   of bowel preparation was evaluated using the [place] Bowel Preparation   Scale with scores of: right colon = 2, transverse colon = 2, left colon =   2. The total BBPS score was 6. Bowel prep was adequate. The patient's   estimated blood loss was minimal (<5 mL). The procedure was not difficult.   The patient tolerated the procedure well. There were no apparent adverse   events.     Scope: Colonoscope  Scope Serial: [id number]    Events    Procedure Events   Event Event Time     Procedure Events   Event Event Time   ENDO SCOPE IN TIME 4/1/2024 11:23 AM   ENDO CECUM REACHED 4/1/2024 11:27 AM   ENDO SCOPE OUT TIME  4/1/2024 11:42 AM     CECAL WITHDRAWAL TIME: 15m 08s    Findings  Four polyps in the transverse colon and sigmoid colon; performed cold   forceps biopsy with complete en bloc removal; performed cold snare with   complete en bloc removal and retrieved specimen  Mild abnormal mucosa in the ileocecal valve; performed cold forceps   biopsy. Redundant appearing ICV; biopsied to rule out adenomatous polyp  Multiple small and medium, scattered pancolonic diverticula of mild   severity; no bleeding was identified  The cecum appeared normal.  Internal (grade 2) hemorrhoids; no bleeding was identified         **Labs and x-rays personally reviewed by me    ** reviewed           Assessment & Plan:       1. Essential hypertension  -     Basic Metabolic Panel; Future; Expected date: 07/02/2024  -     CBC Auto Differential; Future; Expected date: 07/02/2024  -     Hepatic Function Panel; Future; Expected date: 07/02/2024  -     PSA, Screening; Future; Expected date: 07/02/2024  -     Hemoglobin A1C; Future; Expected date: 07/02/2024  -     Lipid Panel; Future; Expected date: 07/02/2024    2. Dyslipidemia    3. Benign prostatic hyperplasia, unspecified whether lower urinary tract symptoms present    Other orders  -     atorvastatin (LIPITOR) 20 MG tablet; Take 1 tablet (20 mg total) by mouth once daily.  Dispense: 90 tablet; Refill: 1  -     carvediloL (COREG) 25 MG tablet; Take 1 tablet (25 mg total) by mouth 2 (two) times daily with meals.  Dispense: 180 tablet; Refill: 1  -     chlorthalidone (HYGROTEN) 25 MG Tab; Take 1 tablet (25 mg total) by mouth once daily.  Dispense: 90 tablet; Refill: 1  -     gabapentin (NEURONTIN) 300 MG capsule; Take 1 capsule (300 mg total) by mouth nightly as needed (PRN).  Dispense: 30 capsule; Refill: 2  -     lisinopriL (PRINIVIL,ZESTRIL) 20 MG tablet; Take 1 tablet (20 mg total) by mouth once daily.  Dispense: 90 tablet; Refill: 1  -     tamsulosin (FLOMAX) 0.4 mg Cap; Take 1 capsule (0.4 mg  total) by mouth every evening.  Dispense: 90 capsule; Refill: 1            Burt Martinez MD

## 2024-11-05 ENCOUNTER — OFFICE VISIT (OUTPATIENT)
Dept: FAMILY MEDICINE | Facility: CLINIC | Age: 59
End: 2024-11-05
Payer: COMMERCIAL

## 2024-11-05 VITALS
TEMPERATURE: 99 F | WEIGHT: 293.19 LBS | BODY MASS INDEX: 39.71 KG/M2 | RESPIRATION RATE: 18 BRPM | OXYGEN SATURATION: 95 % | DIASTOLIC BLOOD PRESSURE: 69 MMHG | HEART RATE: 73 BPM | SYSTOLIC BLOOD PRESSURE: 115 MMHG | HEIGHT: 72 IN

## 2024-11-05 DIAGNOSIS — E78.5 DYSLIPIDEMIA: ICD-10-CM

## 2024-11-05 DIAGNOSIS — I10 ESSENTIAL HYPERTENSION: Primary | ICD-10-CM

## 2024-11-05 DIAGNOSIS — N40.0 BENIGN PROSTATIC HYPERPLASIA, UNSPECIFIED WHETHER LOWER URINARY TRACT SYMPTOMS PRESENT: ICD-10-CM

## 2024-11-05 DIAGNOSIS — M54.10 RADICULOPATHY, UNSPECIFIED SPINAL REGION: ICD-10-CM

## 2024-11-05 LAB
ALBUMIN SERPL BCP-MCNC: 3.5 G/DL (ref 3.5–5)
ALBUMIN/GLOB SERPL: 0.7 {RATIO}
ALP SERPL-CCNC: 72 U/L (ref 45–115)
ALT SERPL W P-5'-P-CCNC: 76 U/L (ref 16–61)
ANION GAP SERPL CALCULATED.3IONS-SCNC: 8 MMOL/L (ref 7–16)
AST SERPL W P-5'-P-CCNC: 80 U/L (ref 15–37)
BILIRUB SERPL-MCNC: 0.7 MG/DL (ref ?–1.2)
BUN SERPL-MCNC: 17 MG/DL (ref 7–18)
BUN/CREAT SERPL: 20 (ref 6–20)
CALCIUM SERPL-MCNC: 9.2 MG/DL (ref 8.5–10.1)
CHLORIDE SERPL-SCNC: 105 MMOL/L (ref 98–107)
CO2 SERPL-SCNC: 29 MMOL/L (ref 21–32)
CREAT SERPL-MCNC: 0.86 MG/DL (ref 0.7–1.3)
EGFR (NO RACE VARIABLE) (RUSH/TITUS): 99 ML/MIN/1.73M2
GLOBULIN SER-MCNC: 5.1 G/DL (ref 2–4)
GLUCOSE SERPL-MCNC: 89 MG/DL (ref 74–106)
POTASSIUM SERPL-SCNC: 3 MMOL/L (ref 3.5–5.1)
PROT SERPL-MCNC: 8.6 G/DL (ref 6.4–8.2)
SODIUM SERPL-SCNC: 139 MMOL/L (ref 136–145)

## 2024-11-05 PROCEDURE — 3078F DIAST BP <80 MM HG: CPT | Mod: CPTII,,, | Performed by: FAMILY MEDICINE

## 2024-11-05 PROCEDURE — 80053 COMPREHEN METABOLIC PANEL: CPT | Mod: ,,, | Performed by: CLINICAL MEDICAL LABORATORY

## 2024-11-05 PROCEDURE — 3074F SYST BP LT 130 MM HG: CPT | Mod: CPTII,,, | Performed by: FAMILY MEDICINE

## 2024-11-05 PROCEDURE — 3044F HG A1C LEVEL LT 7.0%: CPT | Mod: CPTII,,, | Performed by: FAMILY MEDICINE

## 2024-11-05 PROCEDURE — 1159F MED LIST DOCD IN RCRD: CPT | Mod: CPTII,,, | Performed by: FAMILY MEDICINE

## 2024-11-05 PROCEDURE — 3008F BODY MASS INDEX DOCD: CPT | Mod: CPTII,,, | Performed by: FAMILY MEDICINE

## 2024-11-05 PROCEDURE — 99214 OFFICE O/P EST MOD 30 MIN: CPT | Mod: ,,, | Performed by: FAMILY MEDICINE

## 2024-11-05 PROCEDURE — 4010F ACE/ARB THERAPY RXD/TAKEN: CPT | Mod: CPTII,,, | Performed by: FAMILY MEDICINE

## 2024-11-05 RX ORDER — CHLORTHALIDONE 25 MG/1
25 TABLET ORAL DAILY
Qty: 90 TABLET | Refills: 1 | Status: SHIPPED | OUTPATIENT
Start: 2024-11-05

## 2024-11-05 RX ORDER — TAMSULOSIN HYDROCHLORIDE 0.4 MG/1
0.4 CAPSULE ORAL NIGHTLY
Qty: 90 CAPSULE | Refills: 1 | Status: SHIPPED | OUTPATIENT
Start: 2024-11-05

## 2024-11-05 RX ORDER — FAMOTIDINE 20 MG/1
20 TABLET, FILM COATED ORAL 2 TIMES DAILY
Status: CANCELLED | OUTPATIENT
Start: 2024-11-05

## 2024-11-05 RX ORDER — CARVEDILOL 25 MG/1
25 TABLET ORAL 2 TIMES DAILY WITH MEALS
Qty: 180 TABLET | Refills: 1 | Status: SHIPPED | OUTPATIENT
Start: 2024-11-05

## 2024-11-05 RX ORDER — LISINOPRIL 20 MG/1
20 TABLET ORAL DAILY
Qty: 90 TABLET | Refills: 1 | Status: SHIPPED | OUTPATIENT
Start: 2024-11-05

## 2024-11-05 RX ORDER — ATORVASTATIN CALCIUM 20 MG/1
20 TABLET, FILM COATED ORAL NIGHTLY
Qty: 90 TABLET | Refills: 1 | Status: SHIPPED | OUTPATIENT
Start: 2024-11-05

## 2024-11-05 RX ORDER — GABAPENTIN 300 MG/1
300 CAPSULE ORAL 2 TIMES DAILY PRN
Qty: 180 CAPSULE | Refills: 1 | Status: SHIPPED | OUTPATIENT
Start: 2024-11-05

## 2024-11-05 NOTE — PROGRESS NOTES
Clinic Note    Patient Name: Juan C Carranza  : 1965  MRN: [id number]    Chief Complaint   Patient presents with    Leg Pain     Pt reports bilateral leg pain.        HPI:    Mr. Juan C Carranza is a 59 y.o. male who presents to clinic today with CC of pain in bilateral legs. Patient reports pain is a burning pain. Admits this is a chronic, intermittent issue but reports symptom control is not at goal.  Patient reports a PMH significant for HTN, HLD, obesity, vitamin D deficiency, radiculopathy, OA, and BPH.  Patient reports chronic issues are well controlled on current medication regimen.  Denies problems or side effects with medications.  Requests refills.  Up to date on lab work.  Patient is, otherwise, without complaints.     Medications:  Medication List with Changes/Refills   Current Medications    ASPIRIN (ECOTRIN) 81 MG EC TABLET    Take 81 mg by mouth once daily.    BALOXAVIR MARBOXIL (XOFLUZA) 40 MG TABLET    Take 40 mg by mouth. TAKE 2 TABS DAILY    CETIRIZINE (ZYRTEC) 10 MG TABLET    Take 10 mg by mouth once daily.    CHOLECALCIFEROL, VITAMIN D3, 1,250 MCG (50,000 UNIT) CAPSULE    Take 50,000 Units by mouth once a week.    FAMOTIDINE (PEPCID) 20 MG TABLET    Take 20 mg by mouth 2 (two) times daily.    LACTULOSE (CHRONULAC) 20 GRAM/30 ML SOLN    Take 30 mLs (20 g total) by mouth 2 (two) times daily.    NAPROXEN (NAPROSYN) 500 MG TABLET    Take 1 tablet (500 mg total) by mouth 2 (two) times a day.    SILDENAFIL (VIAGRA) 100 MG TABLET    Take 1 tablet (100 mg total) by mouth daily as needed for Erectile Dysfunction.    SULFAMETHOXAZOLE-TRIMETHOPRIM 800-160MG (BACTRIM DS) 800-160 MG TAB    Take 1 tablet by mouth 2 (two) times daily.   Changed and/or Refilled Medications    Modified Medication Previous Medication    ATORVASTATIN (LIPITOR) 20 MG TABLET atorvastatin (LIPITOR) 20 MG tablet       Take 1 tablet (20 mg total) by mouth every evening.    Take 1 tablet (20 mg total) by mouth once daily.    CARVEDILOL  (COREG) 25 MG TABLET carvediloL (COREG) 25 MG tablet       Take 1 tablet (25 mg total) by mouth 2 (two) times daily with meals.    Take 1 tablet (25 mg total) by mouth 2 (two) times daily with meals.    CHLORTHALIDONE (HYGROTEN) 25 MG TAB chlorthalidone (HYGROTEN) 25 MG Tab       Take 1 tablet (25 mg total) by mouth once daily.    Take 1 tablet (25 mg total) by mouth once daily.    GABAPENTIN (NEURONTIN) 300 MG CAPSULE gabapentin (NEURONTIN) 300 MG capsule       Take 1 capsule (300 mg total) by mouth 2 (two) times daily as needed (nerve pain).    Take 1 capsule (300 mg total) by mouth nightly as needed (PRN).    LISINOPRIL (PRINIVIL,ZESTRIL) 20 MG TABLET lisinopriL (PRINIVIL,ZESTRIL) 20 MG tablet       Take 1 tablet (20 mg total) by mouth once daily.    Take 1 tablet (20 mg total) by mouth once daily.    TAMSULOSIN (FLOMAX) 0.4 MG CAP tamsulosin (FLOMAX) 0.4 mg Cap       Take 1 capsule (0.4 mg total) by mouth every evening.    Take 1 capsule (0.4 mg total) by mouth every evening.        Allergies: Patient has no known allergies.      Past Medical History:    Past Medical History:   Diagnosis Date    Cardiomegaly     Hyperlipidemia     Hypertension     Palpitations        Past Surgical History:    No past surgical history on file.      Social History:    Social History     Tobacco Use   Smoking Status Never   Smokeless Tobacco Never     Social History     Substance and Sexual Activity   Alcohol Use Yes     Social History     Substance and Sexual Activity   Drug Use Not on file         Family History:    Family History   Family history unknown: Yes       Review of Systems:    Review of Systems   Constitutional:  Negative for appetite change, chills, fatigue, fever and unexpected weight change.   Eyes:  Negative for visual disturbance.   Respiratory:  Negative for cough and shortness of breath.    Cardiovascular:  Negative for chest pain and leg swelling.   Gastrointestinal:  Negative for abdominal pain, change in  bowel habit, constipation, diarrhea, nausea and vomiting.   Musculoskeletal:  Positive for arthralgias.   Integumentary:  Negative for rash.   Neurological:  Negative for dizziness and headaches.        + neuropathy/nerve pain   Psychiatric/Behavioral:  The patient is not nervous/anxious.         Vitals:    Vitals:    11/05/24 0955 11/05/24 1103   BP: (!) 143/97 115/69   BP Location: Left arm Left arm   Patient Position: Sitting Sitting   Pulse: 73    Resp: 18    Temp: 98.6 °F (37 °C)    TempSrc: Oral    SpO2: 95%    Weight: 133 kg (293 lb 3.2 oz)    Height: 6' (1.829 m)        Body mass index is 39.77 kg/m².    Wt Readings from Last 3 Encounters:   11/05/24 0955 133 kg (293 lb 3.2 oz)   07/02/24 1509 132.5 kg (292 lb)   01/22/24 1125 125.2 kg (276 lb)        Physical Exam:    Physical Exam  Constitutional:       General: He is not in acute distress.     Appearance: Normal appearance. He is obese.   HENT:      Nose: Nose normal.      Mouth/Throat:      Mouth: Mucous membranes are moist.      Pharynx: Oropharynx is clear.   Eyes:      Conjunctiva/sclera: Conjunctivae normal.   Cardiovascular:      Rate and Rhythm: Normal rate and regular rhythm.      Heart sounds: Normal heart sounds. No murmur heard.  Pulmonary:      Effort: Pulmonary effort is normal. No respiratory distress.      Breath sounds: Normal breath sounds. No wheezing, rhonchi or rales.   Abdominal:      General: Bowel sounds are normal.      Palpations: Abdomen is soft.      Tenderness: There is no abdominal tenderness.   Musculoskeletal:      Cervical back: Neck supple.      Right lower leg: No edema.      Left lower leg: No edema.   Skin:     Findings: No rash.   Neurological:      General: No focal deficit present.      Mental Status: He is alert. Mental status is at baseline.   Psychiatric:         Mood and Affect: Mood normal.         Assessment/Plan:   1. Essential hypertension  -     carvediloL (COREG) 25 MG tablet; Take 1 tablet (25 mg total) by  mouth 2 (two) times daily with meals.  Dispense: 180 tablet; Refill: 1  -     chlorthalidone (HYGROTEN) 25 MG Tab; Take 1 tablet (25 mg total) by mouth once daily.  Dispense: 90 tablet; Refill: 1  -     lisinopriL (PRINIVIL,ZESTRIL) 20 MG tablet; Take 1 tablet (20 mg total) by mouth once daily.  Dispense: 90 tablet; Refill: 1  -     Comprehensive Metabolic Panel; Future; Expected date: 11/05/2024    2. Dyslipidemia  -     atorvastatin (LIPITOR) 20 MG tablet; Take 1 tablet (20 mg total) by mouth every evening.  Dispense: 90 tablet; Refill: 1    3. Benign prostatic hyperplasia, unspecified whether lower urinary tract symptoms present  -     tamsulosin (FLOMAX) 0.4 mg Cap; Take 1 capsule (0.4 mg total) by mouth every evening.  Dispense: 90 capsule; Refill: 1    4. Radiculopathy, unspecified spinal region  -     gabapentin (NEURONTIN) 300 MG capsule; Take 1 capsule (300 mg total) by mouth 2 (two) times daily as needed (nerve pain).  Dispense: 180 capsule; Refill: 1 - patient was previously prescribed this medication but has not been taking it. Believes he ran out. Medication refilled to see if this will help the pain in his legs.         Active Problem List with Overview Notes    Diagnosis Date Noted    Benign prostatic hyperplasia 07/10/2024    Dyslipidemia 07/10/2024    Constipation 01/24/2024    Chest pain 10/23/2023    Left sided abdominal pain 10/23/2023    Left low back pain 05/26/2023    Osteoarthritis 05/26/2023    Need for hepatitis C screening test 05/26/2023    Obesity 05/23/2023    Vitamin D deficiency 05/23/2023    Essential hypertension 05/23/2023    Radiculopathy 05/23/2023    Left shoulder pain 05/23/2023    Screening for HIV (human immunodeficiency virus) 05/23/2023    High cholesterol 05/23/2023        RTC in 6 weeks for follow up on neuropathy/nerve pain in legs.  RTC sooner if symptoms worsen or fail to resolve.  Patient voiced understanding and is agreeable to plan.      Gricelda Jewell,  MD    Family Medicine

## 2024-11-06 ENCOUNTER — TELEPHONE (OUTPATIENT)
Dept: FAMILY MEDICINE | Facility: CLINIC | Age: 59
End: 2024-11-06
Payer: COMMERCIAL

## 2024-11-06 DIAGNOSIS — R74.8 ELEVATED LIVER ENZYMES: Primary | ICD-10-CM

## 2024-11-06 DIAGNOSIS — E87.6 HYPOKALEMIA: Primary | ICD-10-CM

## 2024-11-06 DIAGNOSIS — R74.8 ELEVATED LIVER ENZYMES: ICD-10-CM

## 2024-11-06 RX ORDER — POTASSIUM CHLORIDE 20 MEQ/1
20 TABLET, EXTENDED RELEASE ORAL 2 TIMES DAILY
Qty: 180 TABLET | Refills: 1 | Status: SHIPPED | OUTPATIENT
Start: 2024-11-06

## 2024-11-06 NOTE — TELEPHONE ENCOUNTER
----- Message from Sallie Jewell MD sent at 11/6/2024 12:58 PM CST -----  Please call patient regarding lab results. Potassium is low. Recommend starting potassium supplementation 20 meq PO BID.   Liver enzymes are elevated. This is fairly stable. Recommend repeat CMP (lab only) in 2 weeks. Recommend hepatitis ABC screening. Recommend liver US be scheduled at [facility].   Thanks!

## 2024-11-06 NOTE — TELEPHONE ENCOUNTER
Call made to pt regarding lab results. No answer. Left message for pt to call back. US liver, potassium, cmp lab ordered.

## 2024-11-11 ENCOUNTER — HOSPITAL ENCOUNTER (OUTPATIENT)
Dept: RADIOLOGY | Facility: HOSPITAL | Age: 59
Discharge: HOME | End: 2024-11-11
Attending: FAMILY MEDICINE
Payer: COMMERCIAL

## 2024-11-11 DIAGNOSIS — R74.8 ELEVATED LIVER ENZYMES: ICD-10-CM

## 2024-11-11 PROCEDURE — 76705 ECHO EXAM OF ABDOMEN: CPT | Mod: TC

## 2024-11-11 PROCEDURE — 76705 ECHO EXAM OF ABDOMEN: CPT | Mod: 26,,, | Performed by: RADIOLOGY

## 2024-11-14 ENCOUNTER — TELEPHONE (OUTPATIENT)
Dept: FAMILY MEDICINE | Facility: CLINIC | Age: 59
End: 2024-11-14
Payer: COMMERCIAL

## 2024-11-14 DIAGNOSIS — R74.8 ELEVATED LIVER ENZYMES: Primary | ICD-10-CM

## 2024-11-14 DIAGNOSIS — K76.0 HEPATIC STEATOSIS: ICD-10-CM

## 2024-11-14 NOTE — TELEPHONE ENCOUNTER
"Contacted pt in regards to US. Informed pt of results. Pt voiced understanding and had no further questions.     ----- Message from Sallie Jewell MD sent at 11/12/2024  4:22 PM CST -----  Please call patient regarding liver US results. Patient's liver is enlarged and consisted with findings of "fatty liver". Recommend low fat diet and exercise. Recommend alcohol cessation if he drinks alcohol. Common bile duct is dilated. However, his bilirubin levels have been normal. Recommend referral to GI ([facility] available if he has no preference). Please use diagnosis codes of "elevated LFT" and "hepatic steatosis". They will likely monitor this for now but recommend GI evaluation. Thanks!  "

## 2024-12-17 DIAGNOSIS — E78.5 DYSLIPIDEMIA: ICD-10-CM

## 2024-12-17 DIAGNOSIS — I10 ESSENTIAL HYPERTENSION: ICD-10-CM

## 2024-12-17 RX ORDER — LISINOPRIL 20 MG/1
20 TABLET ORAL DAILY
Qty: 90 TABLET | Refills: 1 | Status: SHIPPED | OUTPATIENT
Start: 2024-12-17

## 2024-12-17 RX ORDER — ATORVASTATIN CALCIUM 20 MG/1
20 TABLET, FILM COATED ORAL NIGHTLY
Qty: 90 TABLET | Refills: 1 | Status: SHIPPED | OUTPATIENT
Start: 2024-12-17

## 2025-01-06 ENCOUNTER — OFFICE VISIT (OUTPATIENT)
Dept: FAMILY MEDICINE | Facility: CLINIC | Age: 60
End: 2025-01-06
Payer: COMMERCIAL

## 2025-01-06 VITALS
SYSTOLIC BLOOD PRESSURE: 130 MMHG | HEIGHT: 72 IN | TEMPERATURE: 99 F | RESPIRATION RATE: 18 BRPM | HEART RATE: 79 BPM | OXYGEN SATURATION: 95 % | WEIGHT: 287 LBS | DIASTOLIC BLOOD PRESSURE: 85 MMHG | BODY MASS INDEX: 38.87 KG/M2

## 2025-01-06 DIAGNOSIS — E87.6 HYPOKALEMIA: Primary | ICD-10-CM

## 2025-01-06 DIAGNOSIS — R74.8 ELEVATED LIVER ENZYMES: ICD-10-CM

## 2025-01-06 DIAGNOSIS — I10 ESSENTIAL HYPERTENSION: ICD-10-CM

## 2025-01-06 PROCEDURE — 1159F MED LIST DOCD IN RCRD: CPT | Mod: CPTII,,, | Performed by: FAMILY MEDICINE

## 2025-01-06 PROCEDURE — 3008F BODY MASS INDEX DOCD: CPT | Mod: CPTII,,, | Performed by: FAMILY MEDICINE

## 2025-01-06 PROCEDURE — 99214 OFFICE O/P EST MOD 30 MIN: CPT | Mod: ,,, | Performed by: FAMILY MEDICINE

## 2025-01-06 PROCEDURE — 80053 COMPREHEN METABOLIC PANEL: CPT | Mod: ,,, | Performed by: CLINICAL MEDICAL LABORATORY

## 2025-01-06 PROCEDURE — 86803 HEPATITIS C AB TEST: CPT | Mod: ,,, | Performed by: CLINICAL MEDICAL LABORATORY

## 2025-01-06 PROCEDURE — 86709 HEPATITIS A IGM ANTIBODY: CPT | Mod: ,,, | Performed by: CLINICAL MEDICAL LABORATORY

## 2025-01-06 PROCEDURE — 3079F DIAST BP 80-89 MM HG: CPT | Mod: CPTII,,, | Performed by: FAMILY MEDICINE

## 2025-01-06 PROCEDURE — 3075F SYST BP GE 130 - 139MM HG: CPT | Mod: CPTII,,, | Performed by: FAMILY MEDICINE

## 2025-01-06 PROCEDURE — 86705 HEP B CORE ANTIBODY IGM: CPT | Mod: ,,, | Performed by: CLINICAL MEDICAL LABORATORY

## 2025-01-06 RX ORDER — POTASSIUM CHLORIDE 20 MEQ/1
20 TABLET, EXTENDED RELEASE ORAL 2 TIMES DAILY
Qty: 180 TABLET | Refills: 1 | Status: SHIPPED | OUTPATIENT
Start: 2025-01-06

## 2025-01-06 RX ORDER — CHLORTHALIDONE 25 MG/1
25 TABLET ORAL DAILY
Qty: 90 TABLET | Refills: 1 | Status: SHIPPED | OUTPATIENT
Start: 2025-01-06

## 2025-01-06 NOTE — PROGRESS NOTES
Clinic Note    Patient Name: Juan C Carranza  : 1965  MRN: [id number]    Chief Complaint   Patient presents with    Annual Exam     He's in to talk about his US results. He states he has not heard back about liver US. Report was given to him in clinic and notified him about the new GI appointment. He states he will like to talk to PCP about this. Continues to have right side pain. He states he's a heavy drinker.     Chest Pain     Chest pain that comes and goes.        HPI:    Mr. Juan C Carranza is a 59 y.o. male who presents to clinic today with CC of follow up on liver US. Report had been previously reviewed and sent to the nurse to contact him. Patient, however, has some questions regarding report. GI appt is already scheduled. Patient advised of appt date and time in clinic today. He admits to heavy alcohol use. Denies need for referral for assistance with cessation. Liver US consistent with fatty liver disease.   Patient reports brief sharp pains in chest wall/shoulder that comes and goes. Reports it is worse with movement. Denies any chest pain. Denies any SOB. Denies exertional symptoms. Reports pain feels musculoskeletal and resolves on its own. Offered referral to Cardiology for cardiac evaluation given age and risk factors but patient declined at this time. States his wife is in the hospital undergoing radiation treatments for cancer at this time.  Patient is, otherwise, without complaints.     Medications:  Medication List with Changes/Refills   Current Medications    ASPIRIN (ECOTRIN) 81 MG EC TABLET    Take 81 mg by mouth once daily.    ATORVASTATIN (LIPITOR) 20 MG TABLET    Take 1 tablet (20 mg total) by mouth every evening.    BALOXAVIR MARBOXIL (XOFLUZA) 40 MG TABLET    Take 40 mg by mouth. TAKE 2 TABS DAILY    CARVEDILOL (COREG) 25 MG TABLET    Take 1 tablet (25 mg total) by mouth 2 (two) times daily with meals.    CETIRIZINE (ZYRTEC) 10 MG TABLET    Take 10 mg by mouth once daily.     CHLORTHALIDONE (HYGROTEN) 25 MG TAB    Take 1 tablet (25 mg total) by mouth once daily.    CHOLECALCIFEROL, VITAMIN D3, 1,250 MCG (50,000 UNIT) CAPSULE    Take 50,000 Units by mouth once a week.    FAMOTIDINE (PEPCID) 20 MG TABLET    Take 20 mg by mouth 2 (two) times daily.    GABAPENTIN (NEURONTIN) 300 MG CAPSULE    Take 1 capsule (300 mg total) by mouth 2 (two) times daily as needed (nerve pain).    LACTULOSE (CHRONULAC) 20 GRAM/30 ML SOLN    Take 30 mLs (20 g total) by mouth 2 (two) times daily.    LISINOPRIL (PRINIVIL,ZESTRIL) 20 MG TABLET    Take 1 tablet (20 mg total) by mouth once daily.    NAPROXEN (NAPROSYN) 500 MG TABLET    Take 1 tablet (500 mg total) by mouth 2 (two) times a day.    POTASSIUM CHLORIDE SA (K-DUR,KLOR-CON) 20 MEQ TABLET    Take 1 tablet (20 mEq total) by mouth 2 (two) times daily.    SILDENAFIL (VIAGRA) 100 MG TABLET    Take 1 tablet (100 mg total) by mouth daily as needed for Erectile Dysfunction.    SULFAMETHOXAZOLE-TRIMETHOPRIM 800-160MG (BACTRIM DS) 800-160 MG TAB    Take 1 tablet by mouth 2 (two) times daily.    TAMSULOSIN (FLOMAX) 0.4 MG CAP    Take 1 capsule (0.4 mg total) by mouth every evening.        Allergies: Patient has no known allergies.      Past Medical History:    Past Medical History:   Diagnosis Date    Cardiomegaly     Hyperlipidemia     Hypertension     Palpitations        Past Surgical History:    History reviewed. No pertinent surgical history.      Social History:    Social History     Tobacco Use   Smoking Status Never   Smokeless Tobacco Never     Social History     Substance and Sexual Activity   Alcohol Use Yes     Social History     Substance and Sexual Activity   Drug Use Not on file         Family History:    Family History   Family history unknown: Yes       Review of Systems:    Review of Systems   Constitutional:  Negative for appetite change, chills, fatigue, fever and unexpected weight change.   Eyes:  Negative for visual disturbance.   Respiratory:   "Negative for cough and shortness of breath.    Cardiovascular:  Negative for chest pain and leg swelling.   Gastrointestinal:  Negative for abdominal pain, change in bowel habit, constipation, diarrhea, nausea and vomiting.   Musculoskeletal:  Positive for arthralgias.   Integumentary:  Negative for rash.   Neurological:  Negative for dizziness and headaches.   Psychiatric/Behavioral:  The patient is not nervous/anxious.         Vitals:    Vitals:    01/06/25 1543   BP: 130/85   BP Location: Left arm   Patient Position: Sitting   Pulse: 79   Resp: 18   Temp: 99 °F (37.2 °C)   TempSrc: Oral   SpO2: 95%   Weight: 130.2 kg (287 lb)   Height: 6' 0.01" (1.829 m)       Body mass index is 38.92 kg/m².    Wt Readings from Last 3 Encounters:   01/06/25 1543 130.2 kg (287 lb)   11/05/24 0955 133 kg (293 lb 3.2 oz)   07/02/24 1509 132.5 kg (292 lb)        Physical Exam:    Physical Exam  Constitutional:       General: He is not in acute distress.     Appearance: Normal appearance. He is obese.   HENT:      Nose: Nose normal.      Mouth/Throat:      Mouth: Mucous membranes are moist.      Pharynx: Oropharynx is clear.   Eyes:      Conjunctiva/sclera: Conjunctivae normal.   Cardiovascular:      Rate and Rhythm: Normal rate and regular rhythm.      Heart sounds: Normal heart sounds. No murmur heard.  Pulmonary:      Effort: Pulmonary effort is normal. No respiratory distress.      Breath sounds: Normal breath sounds. No wheezing, rhonchi or rales.   Abdominal:      General: Bowel sounds are normal.      Palpations: Abdomen is soft.      Tenderness: There is no abdominal tenderness.   Musculoskeletal:         General: Tenderness present. No swelling. Normal range of motion.      Cervical back: Neck supple.      Right lower leg: No edema.      Left lower leg: No edema.   Skin:     Findings: No rash.   Neurological:      General: No focal deficit present.      Mental Status: He is alert. Mental status is at baseline.   Psychiatric:  "        Mood and Affect: Mood normal.         Assessment/Plan:   1. Hypokalemia  -     potassium chloride SA (K-DUR,KLOR-CON) 20 MEQ tablet; Take 1 tablet (20 mEq total) by mouth 2 (two) times daily.  Dispense: 180 tablet; Refill: 1  -     Comprehensive Metabolic Panel    2. Essential hypertension  -     chlorthalidone (HYGROTEN) 25 MG Tab; Take 1 tablet (25 mg total) by mouth once daily.  Dispense: 90 tablet; Refill: 1    3. Elevated liver enzymes  -     Hepatitis C Antibody  -     Hepatitis A Antibody, IgM  -     Hepatitis B Core Antibody, IgM  - US completed  - Follow up with GI as scheduled.      Offered referral to Cardiology for cardiac work up. Patient declined at this time. See HPI.          Active Problem List with Overview Notes    Diagnosis Date Noted    Benign prostatic hyperplasia 07/10/2024    Dyslipidemia 07/10/2024    Constipation 01/24/2024    Chest pain 10/23/2023    Left sided abdominal pain 10/23/2023    Left low back pain 05/26/2023    Osteoarthritis 05/26/2023    Need for hepatitis C screening test 05/26/2023    Obesity 05/23/2023    Vitamin D deficiency 05/23/2023    Essential hypertension 05/23/2023    Radiculopathy 05/23/2023    Left shoulder pain 05/23/2023    Screening for HIV (human immunodeficiency virus) 05/23/2023    High cholesterol 05/23/2023          RTC in 3 months for chronic follow up. RTC sooner if needed.  Patient voiced understanding and is agreeable to plan.      Gricelda Jewell MD    Family Medicine

## 2025-01-07 LAB
ALBUMIN SERPL BCP-MCNC: 3.7 G/DL (ref 3.4–4.8)
ALBUMIN/GLOB SERPL: 0.8 {RATIO}
ALP SERPL-CCNC: 73 U/L (ref 40–150)
ALT SERPL W P-5'-P-CCNC: 65 U/L
ANION GAP SERPL CALCULATED.3IONS-SCNC: 11 MMOL/L (ref 7–16)
AST SERPL W P-5'-P-CCNC: 71 U/L (ref 5–34)
BILIRUB SERPL-MCNC: 0.5 MG/DL
BUN SERPL-MCNC: 14 MG/DL (ref 8–26)
BUN/CREAT SERPL: 16 (ref 6–20)
CALCIUM SERPL-MCNC: 9.1 MG/DL (ref 8.8–10)
CHLORIDE SERPL-SCNC: 107 MMOL/L (ref 98–107)
CO2 SERPL-SCNC: 25 MMOL/L (ref 23–31)
CREAT SERPL-MCNC: 0.86 MG/DL (ref 0.72–1.25)
EGFR (NO RACE VARIABLE) (RUSH/TITUS): 99 ML/MIN/1.73M2
GLOBULIN SER-MCNC: 4.6 G/DL (ref 2–4)
GLUCOSE SERPL-MCNC: 112 MG/DL (ref 82–115)
HAV IGM SER QL: NORMAL
HBV CORE IGM SER QL: NORMAL
HCV AB SER QL: NORMAL
POTASSIUM SERPL-SCNC: 3 MMOL/L (ref 3.5–5.1)
PROT SERPL-MCNC: 8.3 G/DL (ref 5.8–7.6)
SODIUM SERPL-SCNC: 140 MMOL/L (ref 136–145)

## 2025-01-10 ENCOUNTER — TELEPHONE (OUTPATIENT)
Dept: FAMILY MEDICINE | Facility: CLINIC | Age: 60
End: 2025-01-10
Payer: COMMERCIAL

## 2025-01-10 DIAGNOSIS — R74.8 ELEVATED LIVER ENZYMES: Primary | ICD-10-CM

## 2025-01-10 NOTE — TELEPHONE ENCOUNTER
Contacted pt in regards to lab results. Informed pt of results. Pt states he has not been taking his potassium as prescribed. Informed pt to start taking potassium as prescribed, instead of increasing the dosage. Pt voiced understanding and had no further questions. Lab ordered.     ----- Message from Sallie Jewell MD sent at 1/8/2025  2:35 PM CST -----  Please call patient regarding lab results. Potassium is low. Please make sure patient is taking potassium supplementation as prescribed 20 meq PO BID. If yes, recommend increasing to 40 meq PO BID. If no, start taking supplementation as prescribed. Repeat CMP (lab only) in 2 weeks. Liver enzymes remain elevated but are improved from previous. Follow up with GI as scheduled.   Hepatitis screening is negative/normal. Thanks!

## 2025-01-14 ENCOUNTER — TELEPHONE (OUTPATIENT)
Dept: GASTROENTEROLOGY | Facility: CLINIC | Age: 60
End: 2025-01-14
Payer: COMMERCIAL

## 2025-01-14 ENCOUNTER — OFFICE VISIT (OUTPATIENT)
Dept: GASTROENTEROLOGY | Facility: CLINIC | Age: 60
End: 2025-01-14
Payer: COMMERCIAL

## 2025-01-14 ENCOUNTER — HOSPITAL ENCOUNTER (OUTPATIENT)
Dept: RADIOLOGY | Facility: HOSPITAL | Age: 60
Discharge: HOME | End: 2025-01-14
Attending: NURSE PRACTITIONER
Payer: COMMERCIAL

## 2025-01-14 VITALS
HEART RATE: 67 BPM | BODY MASS INDEX: 38.47 KG/M2 | WEIGHT: 284 LBS | DIASTOLIC BLOOD PRESSURE: 90 MMHG | OXYGEN SATURATION: 97 % | HEIGHT: 72 IN | SYSTOLIC BLOOD PRESSURE: 133 MMHG

## 2025-01-14 DIAGNOSIS — R74.8 ELEVATED LIVER ENZYMES: Primary | ICD-10-CM

## 2025-01-14 DIAGNOSIS — K59.00 CONSTIPATION, UNSPECIFIED CONSTIPATION TYPE: ICD-10-CM

## 2025-01-14 DIAGNOSIS — R10.84 GENERALIZED ABDOMINAL PAIN: ICD-10-CM

## 2025-01-14 DIAGNOSIS — K76.0 HEPATIC STEATOSIS: ICD-10-CM

## 2025-01-14 PROCEDURE — 99214 OFFICE O/P EST MOD 30 MIN: CPT | Mod: S$PBB,,, | Performed by: NURSE PRACTITIONER

## 2025-01-14 PROCEDURE — 1159F MED LIST DOCD IN RCRD: CPT | Mod: CPTII,,, | Performed by: NURSE PRACTITIONER

## 2025-01-14 PROCEDURE — 85610 PROTHROMBIN TIME: CPT | Performed by: NURSE PRACTITIONER

## 2025-01-14 PROCEDURE — 3075F SYST BP GE 130 - 139MM HG: CPT | Mod: CPTII,,, | Performed by: NURSE PRACTITIONER

## 2025-01-14 PROCEDURE — 99215 OFFICE O/P EST HI 40 MIN: CPT | Mod: PBBFAC,25 | Performed by: NURSE PRACTITIONER

## 2025-01-14 PROCEDURE — 74018 RADEX ABDOMEN 1 VIEW: CPT | Mod: TC

## 2025-01-14 PROCEDURE — 3008F BODY MASS INDEX DOCD: CPT | Mod: CPTII,,, | Performed by: NURSE PRACTITIONER

## 2025-01-14 PROCEDURE — 3080F DIAST BP >= 90 MM HG: CPT | Mod: CPTII,,, | Performed by: NURSE PRACTITIONER

## 2025-01-14 PROCEDURE — 99999 PR PBB SHADOW E&M-EST. PATIENT-LVL V: CPT | Mod: PBBFAC,,, | Performed by: NURSE PRACTITIONER

## 2025-01-14 PROCEDURE — 74018 RADEX ABDOMEN 1 VIEW: CPT | Mod: 26,,, | Performed by: RADIOLOGY

## 2025-01-14 NOTE — TELEPHONE ENCOUNTER
Results and recommendations called to patient. Verbalized understanding.          ----- Message from JEFRY Carlos sent at 1/14/2025 12:44 PM CST -----  X-ray is okay.  Patient did report constipation.  He may take MiraLax powder 17 g daily in 8 oz of liquid for constipation.

## 2025-01-14 NOTE — PROGRESS NOTES
Juan C Carranza is a 59 y.o. male here for Abdominal Pain        PCP: Sallie Pillai  Referring Provider: [place]  [address]  [facility] [place] [id number]     HPI:  In referral due to elevated liver enzymes.  Labs from 11/05/2024 reviewed, ALT 65, AST 71.  Alkaline phos is normal.  Bilirubin is normal.  Liver enzymes have been mildly elevated over the last 2 years.  Hepatitis-B core is negative, Hepatitis A is normal, hepatitis-C is negative.  Reports that he drinks 2-3 [employer] and Coke mixed drinks daily. Discussed the need to avoid alcohol.  Liver ultrasound on 11/11/24 reviewed, Enlarged liver with Diffuse increased echogenicity suggesting an infiltrative process such as steatosis.  Common bile duct is 7 mm.  No previous cholecystectomy.  No calculi or wall thickening of the gallbladder noted on ultrasound.  The patient is reporting generalized abdominal pain and discomfort.  He has not identify a trigger for the abdominal pain.  Endorses intermittent constipation.  Is not currently taking any laxatives.  No hematochezia or melena.  He did have a colonoscopy on 04/01/2024, report reviewed, acute nonspecific colitis in the ileocecal valve. Tubular adenomas removed from the transverse colon.  No unintentional weight loss.  Patient had colonoscopy performed per Dr. Polk. He will continue care with Dr. Gambino and myself per patient.    Abdominal Pain  Associated symptoms include constipation. Pertinent negatives include no diarrhea, fever, nausea or vomiting.         ROS:  Review of Systems   Constitutional:  Negative for activity change, appetite change, fatigue, fever and unexpected weight change.   HENT:  Negative for trouble swallowing.    Cardiovascular:  Negative for chest pain.   Gastrointestinal:  Positive for abdominal pain and constipation. Negative for abdominal distention, blood in stool, change in bowel habit, diarrhea, nausea, vomiting and  reflux.   Musculoskeletal:  Negative for gait problem.   Integumentary:  Negative for color change.   Psychiatric/Behavioral:  Negative for sleep disturbance. The patient is not nervous/anxious.           PMHX:  has a past medical history of Cardiomegaly, Hyperlipidemia, Hypertension, and Palpitations.    PSHX:  has no past surgical history on file.    PFHX: Family history is unknown by patient.    PSlHX:  reports that he has never smoked. He has never used smokeless tobacco. He reports current alcohol use.        Review of patient's allergies indicates:  No Known Allergies    Medication List with Changes/Refills   Current Medications    ASPIRIN (ECOTRIN) 81 MG EC TABLET    Take 81 mg by mouth once daily.    ATORVASTATIN (LIPITOR) 20 MG TABLET    Take 1 tablet (20 mg total) by mouth every evening.    BALOXAVIR MARBOXIL (XOFLUZA) 40 MG TABLET    Take 40 mg by mouth. TAKE 2 TABS DAILY    CARVEDILOL (COREG) 25 MG TABLET    Take 1 tablet (25 mg total) by mouth 2 (two) times daily with meals.    CETIRIZINE (ZYRTEC) 10 MG TABLET    Take 10 mg by mouth once daily.    CHLORTHALIDONE (HYGROTEN) 25 MG TAB    Take 1 tablet (25 mg total) by mouth once daily.    LISINOPRIL (PRINIVIL,ZESTRIL) 20 MG TABLET    Take 1 tablet (20 mg total) by mouth once daily.    POTASSIUM CHLORIDE SA (K-DUR,KLOR-CON) 20 MEQ TABLET    Take 1 tablet (20 mEq total) by mouth 2 (two) times daily.        Objective Findings:  Vital Signs:  BP (!) 133/90   Pulse 67   Ht 6' (1.829 m)   Wt 128.8 kg (284 lb)   SpO2 97%   BMI 38.52 kg/m²  Body mass index is 38.52 kg/m².    Physical Exam:  Physical Exam  Vitals and nursing note reviewed.   Constitutional:       General: He is not in acute distress.     Appearance: Normal appearance.   HENT:      Mouth/Throat:      Mouth: Mucous membranes are moist.   Cardiovascular:      Rate and Rhythm: Normal rate.   Pulmonary:      Effort: Pulmonary effort is normal.      Breath sounds: No wheezing, rhonchi or rales.  "  Abdominal:      General: Abdomen is protuberant. Bowel sounds are normal. There is no distension.      Palpations: Abdomen is soft. There is no mass.      Tenderness: There is no abdominal tenderness.   Skin:     General: Skin is warm and dry.      Coloration: Skin is not jaundiced or pale.   Neurological:      Mental Status: He is alert and oriented to person, place, and time.   Psychiatric:         Mood and Affect: Mood normal.          Labs:  Lab Results   Component Value Date    WBC 3.92 (L) 01/14/2025    HGB 15.9 01/14/2025    HCT 49.0 01/14/2025    MCV 95.3 01/14/2025    RDW 12.9 01/14/2025     01/14/2025    LYMPH 34.4 01/14/2025    LYMPH 1.35 01/14/2025    MONO 13.5 (H) 01/14/2025    EOS 0.13 01/14/2025    BASO 0.02 01/14/2025     Lab Results   Component Value Date     01/06/2025    K 3.0 (L) 01/06/2025     01/06/2025    CO2 25 01/06/2025     01/06/2025    BUN 14 01/06/2025    CREATININE 0.86 01/06/2025    CALCIUM 9.1 01/06/2025    PROT 8.3 (H) 01/06/2025    ALBUMIN 3.7 01/06/2025    BILITOT 0.5 01/06/2025    ALKPHOS 73 01/06/2025    AST 71 (H) 01/06/2025    ALT 65 (H) 01/06/2025         Imaging: No results found.      Assessment:  Juan C Carranza is a 59 y.o. male here with:  1. Elevated liver enzymes    2. Hepatic steatosis    3. Constipation, unspecified constipation type    4. Generalized abdominal pain          Recommendations:  1. KUB today  2. Liver labs below  3. Exercise 150 minutes per week  Weight loss of 7-10%. Weight loss should be gradual  Diet low in saturated fats and carbohydrates  Good glucose and cholesterol control  Reduce and avoid alcohol  4. Schedule CT abdomen and pelvis due to abdominal pain  5. MiraLax powder 17 g in 8 oz of liquid daily for constipation    Portions of this note may have been created with voice recognition software.  Occasional wrong word or "sound a like substitutions may have occurred due to inherent limitations of voice recognition " software.  Please read the note carefully and recognize using contexts, where substitutions have occurred.    Diagnosis, risks, benefits, and side effects of any medications and treatment plan were discussed with the patient.  All questions were answered to the satisfaction of the patient, and patient verbalized understanding and agreement to the treatment plan.        Follow up in about 4 weeks (around 2/11/2025).      Order summary:  Orders Placed This Encounter    X-Ray KUB    CT Abdomen Pelvis With IV Contrast Routine Oral Contrast    Ceruloplasmin    AKIRA EIA w/ Reflex to dsDNA/CARLOS A    Alpha-1-Antitrypsin    Hepatitis B Surface Ab, Qualitative    Ferritin    Iron and TIBC    Protein Electrophoresis, Serum with Reflex MIGUELITO    CBC Auto Differential    Comprehensive Metabolic Panel    Protime-INR    Antimitochondrial Antibody    Anti-Smooth Muscle Antibody    IgG    IgM    IgA    Endomysial Antibodies, IgG    Hepatitis B Surface Antigen       Thank you for allowing me to participate in the care of Juan C Carranza.      GINO LunaC

## 2025-01-14 NOTE — TELEPHONE ENCOUNTER
Called to notify patient that after review, Jacquelyn Romano NP has decided to change the MRI to a CT scan of his abdomen/pelvis. Scheduled for 1/31/25 at 11:30am. Patient states he can come back by today and  instruction sheet and oral contrast. Placed at  for him.

## 2025-01-23 ENCOUNTER — TELEPHONE (OUTPATIENT)
Dept: GASTROENTEROLOGY | Facility: CLINIC | Age: 60
End: 2025-01-23
Payer: COMMERCIAL

## 2025-01-23 DIAGNOSIS — R89.9 ABNORMAL LABORATORY TEST RESULT: Primary | ICD-10-CM

## 2025-01-23 NOTE — TELEPHONE ENCOUNTER
Results and recommendations called to patient. Verbalized understanding. Agreeable to Rheumatology referral.          ----- Message from JEFRY Carlos sent at 1/23/2025  7:39 AM CST -----  Autoimmune markers are positive.  Recommend that the patient be referred to Rheumatology.  Please call and discuss with the patient.  AMA and anti-smooth muscle antibody are negative.

## 2025-01-31 ENCOUNTER — HOSPITAL ENCOUNTER (OUTPATIENT)
Dept: RADIOLOGY | Facility: HOSPITAL | Age: 60
Discharge: HOME | End: 2025-01-31
Attending: NURSE PRACTITIONER
Payer: COMMERCIAL

## 2025-01-31 DIAGNOSIS — R10.84 GENERALIZED ABDOMINAL PAIN: ICD-10-CM

## 2025-01-31 PROCEDURE — 74177 CT ABD & PELVIS W/CONTRAST: CPT | Mod: 26,,, | Performed by: STUDENT IN AN ORGANIZED HEALTH CARE EDUCATION/TRAINING PROGRAM

## 2025-01-31 PROCEDURE — A9698 NON-RAD CONTRAST MATERIALNOC: HCPCS | Performed by: NURSE PRACTITIONER

## 2025-01-31 PROCEDURE — 25500020 PHARM REV CODE 255: Performed by: NURSE PRACTITIONER

## 2025-01-31 PROCEDURE — 74177 CT ABD & PELVIS W/CONTRAST: CPT | Mod: TC

## 2025-01-31 RX ORDER — IOPAMIDOL 755 MG/ML
100 INJECTION, SOLUTION INTRAVASCULAR
Status: COMPLETED | OUTPATIENT
Start: 2025-01-31 | End: 2025-01-31

## 2025-01-31 RX ADMIN — BARIUM SULFATE 1125 ML: 20 SUSPENSION ORAL at 11:01

## 2025-01-31 RX ADMIN — IOPAMIDOL 100 ML: 755 INJECTION, SOLUTION INTRAVENOUS at 11:01

## 2025-02-04 ENCOUNTER — TELEPHONE (OUTPATIENT)
Dept: GASTROENTEROLOGY | Facility: CLINIC | Age: 60
End: 2025-02-04
Payer: COMMERCIAL

## 2025-02-04 NOTE — TELEPHONE ENCOUNTER
Results and recommendations called to patient. Verbalized understanding. Patient was previously aware about hernia, and would not like referral at this time, but states will give me a call back if he changes his mind.             ----- Message from JEFRY Carlos sent at 2/3/2025  4:06 PM CST -----  Prostate is enlarged.  May follow up this with primary care.  Bilateral renal cysts.  He does have an umbilical hernia without any small-bowel obstruction.  Patient may be offered referral to surgery to discuss.  Mildly enlarged periportal lymph node.  We will repeat scan in about six-month.  This report was discussed with Dr. Gambino.  Believe he was also referred to Rheumatology.  Please follow-up on this appointment.

## 2025-02-11 ENCOUNTER — OFFICE VISIT (OUTPATIENT)
Dept: FAMILY MEDICINE | Facility: CLINIC | Age: 60
End: 2025-02-11
Payer: COMMERCIAL

## 2025-02-11 VITALS
TEMPERATURE: 98 F | RESPIRATION RATE: 17 BRPM | BODY MASS INDEX: 38.6 KG/M2 | SYSTOLIC BLOOD PRESSURE: 129 MMHG | WEIGHT: 285 LBS | HEART RATE: 80 BPM | DIASTOLIC BLOOD PRESSURE: 78 MMHG | HEIGHT: 72 IN | OXYGEN SATURATION: 97 %

## 2025-02-11 DIAGNOSIS — N40.0 ENLARGED PROSTATE: ICD-10-CM

## 2025-02-11 DIAGNOSIS — M79.2 NERVE PAIN: Primary | ICD-10-CM

## 2025-02-11 DIAGNOSIS — Z30.09 VASECTOMY EVALUATION: ICD-10-CM

## 2025-02-11 DIAGNOSIS — Z13.1 SCREENING FOR DIABETES MELLITUS: ICD-10-CM

## 2025-02-11 LAB
EST. AVERAGE GLUCOSE BLD GHB EST-MCNC: 123 MG/DL
HBA1C MFR BLD HPLC: 5.9 %
PSA SERPL-MCNC: 1.17 NG/ML

## 2025-02-11 PROCEDURE — 3074F SYST BP LT 130 MM HG: CPT | Mod: CPTII,,, | Performed by: FAMILY MEDICINE

## 2025-02-11 PROCEDURE — 3008F BODY MASS INDEX DOCD: CPT | Mod: CPTII,,, | Performed by: FAMILY MEDICINE

## 2025-02-11 PROCEDURE — 3078F DIAST BP <80 MM HG: CPT | Mod: CPTII,,, | Performed by: FAMILY MEDICINE

## 2025-02-11 PROCEDURE — 99213 OFFICE O/P EST LOW 20 MIN: CPT | Mod: ,,, | Performed by: FAMILY MEDICINE

## 2025-02-11 PROCEDURE — 84153 ASSAY OF PSA TOTAL: CPT | Mod: ,,, | Performed by: CLINICAL MEDICAL LABORATORY

## 2025-02-11 PROCEDURE — 1159F MED LIST DOCD IN RCRD: CPT | Mod: CPTII,,, | Performed by: FAMILY MEDICINE

## 2025-02-11 PROCEDURE — 83036 HEMOGLOBIN GLYCOSYLATED A1C: CPT | Mod: ,,, | Performed by: CLINICAL MEDICAL LABORATORY

## 2025-02-11 RX ORDER — GABAPENTIN 100 MG/1
CAPSULE ORAL
Qty: 45 CAPSULE | Refills: 2 | Status: SHIPPED | OUTPATIENT
Start: 2025-02-11

## 2025-02-11 NOTE — PROGRESS NOTES
"Clinic Note    Patient Name: Juan C Carranza  : 1965  MRN: [id number]    Chief Complaint   Patient presents with    Follow-up     GI follow up; pt reports he was told he has an enlarged prostate.     Tingling     Pt reports tingling and "burning sensation" of his feet.        HPI:    Mr. Juan C Carranza is a 59 y.o. male who presents to clinic today with CC of follow up. Patient saw JEFRY Hector with GI recently and was evaluated for elevated liver enzymes and hepatic steatosis. He did have additional lab work and imaging. He was found to have a hernia that did not contain bowel. Patient was previously aware of hernia and denied any associated pain or problems. He declined referral to General Surgery.  Patient was incidentally also found to have an enlarged prostate and was advised to follow up with his PCP. Patient denies any significant urinary symptoms. Declined medications. He is agreeable to PSA check today. Admits he does get up once during the night to urinate.   Patient was advised to repeat imaging as previously ordered by GI in 6 months.  Patient has a follow up scheduled with GI as well.  Patient also reports a burning/tingling sensation in bilateral feet.  Patient is, otherwise, without complaints.     Medications:  Medication List with Changes/Refills   New Medications    GABAPENTIN (NEURONTIN) 100 MG CAPSULE    Take one capsule by mouth at night X 3 nights, then gradually increase as tolerated up to 3 times per day as needed for neuropathy/nerve pain.   Current Medications    ASPIRIN (ECOTRIN) 81 MG EC TABLET    Take 81 mg by mouth once daily.    ATORVASTATIN (LIPITOR) 20 MG TABLET    Take 1 tablet (20 mg total) by mouth every evening.    BALOXAVIR MARBOXIL (XOFLUZA) 40 MG TABLET    Take 40 mg by mouth. TAKE 2 TABS DAILY    CARVEDILOL (COREG) 25 MG TABLET    Take 1 tablet (25 mg total) by mouth 2 (two) times daily with meals.    CETIRIZINE (ZYRTEC) 10 MG TABLET    Take 10 mg by mouth once daily.    " CHLORTHALIDONE (HYGROTEN) 25 MG TAB    Take 1 tablet (25 mg total) by mouth once daily.    LISINOPRIL (PRINIVIL,ZESTRIL) 20 MG TABLET    Take 1 tablet (20 mg total) by mouth once daily.    POTASSIUM CHLORIDE SA (K-DUR,KLOR-CON) 20 MEQ TABLET    Take 1 tablet (20 mEq total) by mouth 2 (two) times daily.        Allergies: Patient has no known allergies.      Past Medical History:    Past Medical History:   Diagnosis Date    Cardiomegaly     Hyperlipidemia     Hypertension     Palpitations        Past Surgical History:    No past surgical history on file.      Social History:    Social History     Tobacco Use   Smoking Status Never   Smokeless Tobacco Never     Social History     Substance and Sexual Activity   Alcohol Use Yes     Social History     Substance and Sexual Activity   Drug Use Not on file         Family History:    Family History   Family history unknown: Yes       Review of Systems:    Review of Systems   Constitutional:  Negative for appetite change, chills, fatigue, fever and unexpected weight change.   Eyes:  Negative for visual disturbance.   Respiratory:  Negative for cough and shortness of breath.    Cardiovascular:  Negative for chest pain and leg swelling.   Gastrointestinal:  Negative for abdominal pain, blood in stool, change in bowel habit, constipation, diarrhea, nausea and vomiting.        Reports occasional constipation relieved with prn use of OTC medications   Genitourinary:  Negative for decreased urine volume, difficulty urinating, dysuria, frequency and urgency.   Musculoskeletal:  Negative for arthralgias.   Integumentary:  Negative for rash.   Neurological:  Negative for dizziness and headaches.   Psychiatric/Behavioral:  Negative for sleep disturbance. The patient is not nervous/anxious.         Vitals:    Vitals:    02/11/25 0957   BP: 129/78   BP Location: Left arm   Patient Position: Sitting   Pulse: 80   Resp: 17   Temp: 98 °F (36.7 °C)   TempSrc: Oral   SpO2: 97%   Weight: 129.3  kg (285 lb)   Height: 6' (1.829 m)       Body mass index is 38.65 kg/m².    Wt Readings from Last 3 Encounters:   02/11/25 0957 129.3 kg (285 lb)   01/14/25 0829 128.8 kg (284 lb)   01/06/25 1543 130.2 kg (287 lb)        Physical Exam:    Physical Exam  Constitutional:       General: He is not in acute distress.     Appearance: Normal appearance. He is obese.   HENT:      Nose: Nose normal.      Mouth/Throat:      Mouth: Mucous membranes are moist.      Pharynx: Oropharynx is clear.   Eyes:      Conjunctiva/sclera: Conjunctivae normal.   Cardiovascular:      Rate and Rhythm: Normal rate and regular rhythm.      Heart sounds: Normal heart sounds. No murmur heard.  Pulmonary:      Effort: Pulmonary effort is normal. No respiratory distress.      Breath sounds: Normal breath sounds. No wheezing, rhonchi or rales.   Abdominal:      General: Bowel sounds are normal.      Palpations: Abdomen is soft.      Tenderness: There is no abdominal tenderness. There is no guarding or rebound.      Hernia: A hernia is present.      Comments: + umbilical hernia   Musculoskeletal:      Cervical back: Neck supple.      Right lower leg: No edema.      Left lower leg: No edema.   Skin:     Findings: No rash.   Neurological:      General: No focal deficit present.      Mental Status: He is alert. Mental status is at baseline.   Psychiatric:         Mood and Affect: Mood normal.         Assessment/Plan:   1. Nerve pain  -     gabapentin (NEURONTIN) 100 MG capsule; Take one capsule by mouth at night X 3 nights, then gradually increase as tolerated up to 3 times per day as needed for neuropathy/nerve pain.  Dispense: 45 capsule; Refill: 2- new medication. Risks/benefits/potential side effects/black box warning reviewed and discussed with patient.     2. Screening for diabetes mellitus  -     Hemoglobin A1C; Future; Expected date: 02/11/2025    3. Enlarged prostate  -     PSA, Total (Diagnostic); Future; Expected date: 02/11/2025  -      Ambulatory referral/consult to Urology; Future; Expected date: 02/18/2025    4. Vasectomy evaluation  -     Ambulatory referral/consult to Urology; Future; Expected date: 02/18/2025  - Patient requests to discuss vasectomy with Urology.         Active Problem List with Overview Notes    Diagnosis Date Noted    Elevated liver enzymes 01/14/2025    Hepatic steatosis 01/14/2025    Generalized abdominal pain 01/14/2025    Benign prostatic hyperplasia 07/10/2024    Dyslipidemia 07/10/2024    Constipation 01/24/2024    Chest pain 10/23/2023    Left sided abdominal pain 10/23/2023    Left low back pain 05/26/2023    Osteoarthritis 05/26/2023    Need for hepatitis C screening test 05/26/2023    Obesity 05/23/2023    Vitamin D deficiency 05/23/2023    Essential hypertension 05/23/2023    Radiculopathy 05/23/2023    Left shoulder pain 05/23/2023    Screening for HIV (human immunodeficiency virus) 05/23/2023    High cholesterol 05/23/2023      RTC as scheduled for chronic follow up. RTC sooner if needed.  Patient voiced understanding and is agreeable to plan.      Gricelda Jewell MD    Family Medicine

## 2025-02-14 ENCOUNTER — TELEPHONE (OUTPATIENT)
Dept: FAMILY MEDICINE | Facility: CLINIC | Age: 60
End: 2025-02-14
Payer: COMMERCIAL

## 2025-02-14 NOTE — TELEPHONE ENCOUNTER
Contacted pt in regards to lab results. Informed pt of results. Pt voiced understanding and had no further questions.     ----- Message from Sallie Jewell MD sent at 2/12/2025 11:47 AM CST -----  Please call patient regarding lab results. HbA1C is in the prediabetic range. Recommend low carb diet/exercise.  PSA is normal.  Thanks!

## 2025-03-22 DIAGNOSIS — E78.5 DYSLIPIDEMIA: ICD-10-CM

## 2025-03-24 RX ORDER — ATORVASTATIN CALCIUM 20 MG/1
20 TABLET, FILM COATED ORAL NIGHTLY
Qty: 90 TABLET | Refills: 1 | Status: SHIPPED | OUTPATIENT
Start: 2025-03-24

## 2025-04-30 ENCOUNTER — OFFICE VISIT (OUTPATIENT)
Dept: FAMILY MEDICINE | Facility: CLINIC | Age: 60
End: 2025-04-30
Payer: COMMERCIAL

## 2025-04-30 VITALS
DIASTOLIC BLOOD PRESSURE: 91 MMHG | WEIGHT: 286 LBS | BODY MASS INDEX: 38.74 KG/M2 | TEMPERATURE: 99 F | HEART RATE: 68 BPM | OXYGEN SATURATION: 96 % | RESPIRATION RATE: 17 BRPM | HEIGHT: 72 IN | SYSTOLIC BLOOD PRESSURE: 163 MMHG

## 2025-04-30 DIAGNOSIS — E66.812 CLASS 2 OBESITY DUE TO EXCESS CALORIES WITH BODY MASS INDEX (BMI) OF 38.0 TO 38.9 IN ADULT, UNSPECIFIED WHETHER SERIOUS COMORBIDITY PRESENT: ICD-10-CM

## 2025-04-30 DIAGNOSIS — N40.0 BENIGN PROSTATIC HYPERPLASIA, UNSPECIFIED WHETHER LOWER URINARY TRACT SYMPTOMS PRESENT: ICD-10-CM

## 2025-04-30 DIAGNOSIS — I10 ESSENTIAL HYPERTENSION: Primary | ICD-10-CM

## 2025-04-30 DIAGNOSIS — K59.04 CHRONIC IDIOPATHIC CONSTIPATION: ICD-10-CM

## 2025-04-30 DIAGNOSIS — E66.09 CLASS 2 OBESITY DUE TO EXCESS CALORIES WITH BODY MASS INDEX (BMI) OF 38.0 TO 38.9 IN ADULT, UNSPECIFIED WHETHER SERIOUS COMORBIDITY PRESENT: ICD-10-CM

## 2025-04-30 DIAGNOSIS — E78.2 MIXED HYPERLIPIDEMIA: ICD-10-CM

## 2025-04-30 PROCEDURE — 3077F SYST BP >= 140 MM HG: CPT | Mod: CPTII,,, | Performed by: FAMILY MEDICINE

## 2025-04-30 PROCEDURE — 3079F DIAST BP 80-89 MM HG: CPT | Mod: CPTII,,, | Performed by: FAMILY MEDICINE

## 2025-04-30 PROCEDURE — 99213 OFFICE O/P EST LOW 20 MIN: CPT | Mod: ,,, | Performed by: FAMILY MEDICINE

## 2025-04-30 PROCEDURE — 3044F HG A1C LEVEL LT 7.0%: CPT | Mod: CPTII,,, | Performed by: FAMILY MEDICINE

## 2025-04-30 PROCEDURE — 4010F ACE/ARB THERAPY RXD/TAKEN: CPT | Mod: CPTII,,, | Performed by: FAMILY MEDICINE

## 2025-04-30 PROCEDURE — 1159F MED LIST DOCD IN RCRD: CPT | Mod: CPTII,,, | Performed by: FAMILY MEDICINE

## 2025-04-30 PROCEDURE — 3008F BODY MASS INDEX DOCD: CPT | Mod: CPTII,,, | Performed by: FAMILY MEDICINE

## 2025-04-30 RX ORDER — LISINOPRIL 20 MG/1
20 TABLET ORAL DAILY
Qty: 90 TABLET | Refills: 1 | Status: SHIPPED | OUTPATIENT
Start: 2025-04-30

## 2025-04-30 NOTE — PROGRESS NOTES
Clinic Note    Patient Name: Juan C Carranza  : 1965  MRN: [id number]    Chief Complaint   Patient presents with    Follow-up     3 month follow up        HPI:    Mr. Juan C Carranza is a 59 y.o. male who presents to clinic today with CC of follow up on chronic disease processes including HTN, HLD, obesity, BPH, OA, and vitamin D deficiency.   BP is elevated today. Reports he just took his medication. Reports normal readings at home. BP was normal on his last visit.   Reports he missed his urology appt. Requested this be rescheduled. I did sent referral clerk a message about rescheduling this appt.   Patient reports chronic issues are well controlled on current medication regimen.  Denies problems or side effects with medications.  Patient is, otherwise, without complaints.     Medications:  Medication List with Changes/Refills   Current Medications    ASPIRIN (ECOTRIN) 81 MG EC TABLET    Take 81 mg by mouth once daily.    ATORVASTATIN (LIPITOR) 20 MG TABLET    TAKE 1 TABLET (20 MG TOTAL) BY MOUTH EVERY EVENING.    BALOXAVIR MARBOXIL (XOFLUZA) 40 MG TABLET    Take 40 mg by mouth. TAKE 2 TABS DAILY    CARVEDILOL (COREG) 25 MG TABLET    Take 1 tablet (25 mg total) by mouth 2 (two) times daily with meals.    CETIRIZINE (ZYRTEC) 10 MG TABLET    Take 10 mg by mouth once daily.    CHLORTHALIDONE (HYGROTEN) 25 MG TAB    Take 1 tablet (25 mg total) by mouth once daily.    GABAPENTIN (NEURONTIN) 100 MG CAPSULE    Take one capsule by mouth at night X 3 nights, then gradually increase as tolerated up to 3 times per day as needed for neuropathy/nerve pain.    POTASSIUM CHLORIDE SA (K-DUR,KLOR-CON) 20 MEQ TABLET    Take 1 tablet (20 mEq total) by mouth 2 (two) times daily.   Changed and/or Refilled Medications    Modified Medication Previous Medication    LISINOPRIL (PRINIVIL,ZESTRIL) 20 MG TABLET lisinopriL (PRINIVIL,ZESTRIL) 20 MG tablet       Take 1 tablet (20 mg total) by mouth once daily.    Take 1 tablet (20 mg total) by  mouth once daily.        Allergies: Patient has no known allergies.      Past Medical History:    Past Medical History:   Diagnosis Date    Cardiomegaly     Hyperlipidemia     Hypertension     Palpitations        Past Surgical History:    No past surgical history on file.      Social History:    Tobacco Use History[1]  Social History     Substance and Sexual Activity   Alcohol Use Yes     Social History     Substance and Sexual Activity   Drug Use Not on file         Family History:    Family History   Family history unknown: Yes       Review of Systems:    Review of Systems   Constitutional:  Negative for appetite change, chills, fatigue, fever and unexpected weight change.   Eyes:  Negative for visual disturbance.   Respiratory:  Negative for cough and shortness of breath.    Cardiovascular:  Negative for chest pain.        Reports occasional swelling in legs that resolves with elevation   Gastrointestinal:  Negative for abdominal pain, blood in stool, change in bowel habit, diarrhea, nausea and vomiting.        Reports occasional constipation - at baseline   Musculoskeletal:  Negative for arthralgias.   Integumentary:  Negative for rash.   Neurological:  Negative for dizziness and headaches.   Psychiatric/Behavioral:  The patient is not nervous/anxious.         Vitals:    Vitals:    04/30/25 0753 04/30/25 0829   BP: (!) 163/84 (!) 163/91   BP Location: Left arm Left arm   Patient Position: Sitting Sitting   Pulse: 68    Resp: 17    Temp: 98.6 °F (37 °C)    TempSrc: Oral    SpO2: 96%    Weight: 129.7 kg (286 lb)    Height: 6' (1.829 m)        Body mass index is 38.79 kg/m².    Wt Readings from Last 3 Encounters:   04/30/25 0753 129.7 kg (286 lb)   02/11/25 0957 129.3 kg (285 lb)   01/14/25 0829 128.8 kg (284 lb)        Physical Exam:    Physical Exam  Constitutional:       General: He is not in acute distress.     Appearance: Normal appearance. He is obese.   HENT:      Nose: Nose normal.      Mouth/Throat:       Mouth: Mucous membranes are moist.      Pharynx: Oropharynx is clear.   Eyes:      Conjunctiva/sclera: Conjunctivae normal.   Cardiovascular:      Rate and Rhythm: Normal rate and regular rhythm.      Heart sounds: Normal heart sounds. No murmur heard.  Pulmonary:      Effort: Pulmonary effort is normal. No respiratory distress.      Breath sounds: Normal breath sounds. No wheezing, rhonchi or rales.   Abdominal:      General: Bowel sounds are normal.      Palpations: Abdomen is soft.      Tenderness: There is no abdominal tenderness.   Musculoskeletal:      Cervical back: Neck supple.      Right lower leg: No edema.      Left lower leg: No edema.   Skin:     Findings: No rash.   Neurological:      General: No focal deficit present.      Mental Status: He is alert. Mental status is at baseline.   Psychiatric:         Mood and Affect: Mood normal.           Assessment/Plan:   1. Essential hypertension  -     lisinopriL (PRINIVIL,ZESTRIL) 20 MG tablet; Take 1 tablet (20 mg total) by mouth once daily.  Dispense: 90 tablet; Refill: 1  - BP is elevated in clinic today. Patient reports normal readings at home. Will have nurse follow up with phone call with home BP reading in 1-2 weeks. If remains elevated patient will need to RTC for medication adjustments.  - DASH diet and exercise  - Encouraged medication compliance  - Continue home blood pressure monitoring. Call/RTC for persistently elevated readings.    2. Class 2 obesity due to excess calories with body mass index (BMI) of 38.0 to 38.9 in adult, unspecified whether serious comorbidity present  - BMI discussed with patient.  - Diet and exercise  - Diet discussed and educational information provided  - Recommend 30 minutes of exercise at least 5 days per week.    3. Mixed hyperlipidemia  The current medical regimen is effective;  continue present plan and medications.    4. Chronic idiopathic constipation  The current medical regimen is effective;  continue present  plan and medications.    5. Benign prostatic hyperplasia, unspecified whether lower urinary tract symptoms present  Urology appt to be rescheduled       Active Problem List with Overview Notes    Diagnosis Date Noted    Mixed hyperlipidemia 04/30/2025    Elevated liver enzymes 01/14/2025    Hepatic steatosis 01/14/2025    Generalized abdominal pain 01/14/2025    Benign prostatic hyperplasia 07/10/2024    Dyslipidemia 07/10/2024    Constipation 01/24/2024    Chest pain 10/23/2023    Left sided abdominal pain 10/23/2023    Left low back pain 05/26/2023    Osteoarthritis 05/26/2023    Need for hepatitis C screening test 05/26/2023    Obesity 05/23/2023    Vitamin D deficiency 05/23/2023    Essential hypertension 05/23/2023    Radiculopathy 05/23/2023    Left shoulder pain 05/23/2023    Screening for HIV (human immunodeficiency virus) 05/23/2023    High cholesterol 05/23/2023        Health Maintenance:  Health Maintenance   Topic Date Due    Pneumococcal Vaccines (Age 50+) (1 of 2 - PCV) Never done    Shingles Vaccine (1 of 2) Never done    RSV Vaccine (Age 60+ and Pregnant patients) (1 - Risk 60-74 years 1-dose series) Never done    COVID-19 Vaccine (2 - 2024-25 season) 09/01/2024    Colorectal Cancer Screening  04/01/2027    Hemoglobin A1c (Diabetic Prevention Screening)  02/11/2028    Lipid Panel  07/02/2029    TETANUS VACCINE  05/24/2033    Hepatitis C Screening  Completed    HIV Screening  Completed    Influenza Vaccine  Addressed       RTC in 4 months for chronic follow up.  RTC sooner if needed.   Patient voiced understanding and is agreeable to plan.      Gricelda Jewell MD    Family Medicine           [1]   Social History  Tobacco Use   Smoking Status Never   Smokeless Tobacco Never

## 2025-07-08 DIAGNOSIS — E78.5 DYSLIPIDEMIA: ICD-10-CM

## 2025-07-08 RX ORDER — ATORVASTATIN CALCIUM 20 MG/1
20 TABLET, FILM COATED ORAL NIGHTLY
Qty: 90 TABLET | Refills: 1 | Status: SHIPPED | OUTPATIENT
Start: 2025-07-08

## 2025-07-17 DIAGNOSIS — E87.6 HYPOKALEMIA: ICD-10-CM

## 2025-07-17 DIAGNOSIS — I10 ESSENTIAL HYPERTENSION: ICD-10-CM

## 2025-07-17 DIAGNOSIS — M79.2 NERVE PAIN: ICD-10-CM

## 2025-07-17 RX ORDER — LISINOPRIL 20 MG/1
20 TABLET ORAL DAILY
Qty: 90 TABLET | Refills: 1 | Status: SHIPPED | OUTPATIENT
Start: 2025-07-17

## 2025-07-17 RX ORDER — CARVEDILOL 25 MG/1
25 TABLET ORAL 2 TIMES DAILY WITH MEALS
Qty: 180 TABLET | Refills: 1 | Status: SHIPPED | OUTPATIENT
Start: 2025-07-17

## 2025-07-17 RX ORDER — GABAPENTIN 100 MG/1
CAPSULE ORAL
Qty: 45 CAPSULE | Refills: 2 | Status: SHIPPED | OUTPATIENT
Start: 2025-07-17

## 2025-07-17 RX ORDER — POTASSIUM CHLORIDE 20 MEQ/1
20 TABLET, EXTENDED RELEASE ORAL 2 TIMES DAILY
Qty: 180 TABLET | Refills: 1 | Status: SHIPPED | OUTPATIENT
Start: 2025-07-17

## 2025-07-17 RX ORDER — CHLORTHALIDONE 25 MG/1
25 TABLET ORAL DAILY
Qty: 90 TABLET | Refills: 1 | Status: SHIPPED | OUTPATIENT
Start: 2025-07-17

## 2025-07-24 ENCOUNTER — HOSPITAL ENCOUNTER (EMERGENCY)
Facility: HOSPITAL | Age: 60
Discharge: HOME | End: 2025-07-24
Payer: COMMERCIAL

## 2025-07-24 VITALS
RESPIRATION RATE: 18 BRPM | TEMPERATURE: 98 F | OXYGEN SATURATION: 96 % | BODY MASS INDEX: 39.82 KG/M2 | HEART RATE: 66 BPM | HEIGHT: 72 IN | SYSTOLIC BLOOD PRESSURE: 141 MMHG | WEIGHT: 294 LBS | DIASTOLIC BLOOD PRESSURE: 97 MMHG

## 2025-07-24 DIAGNOSIS — I10 UNCONTROLLED HYPERTENSION: Primary | ICD-10-CM

## 2025-07-24 PROCEDURE — 99282 EMERGENCY DEPT VISIT SF MDM: CPT | Mod: ,,, | Performed by: NURSE PRACTITIONER

## 2025-07-24 PROCEDURE — 99281 EMR DPT VST MAYX REQ PHY/QHP: CPT

## 2025-07-24 NOTE — ED PROVIDER NOTES
Encounter Date: 7/24/2025       History     Chief Complaint   Patient presents with    Dizziness     History of hypertension.  Patient presents with concern of elevated blood pressure.  Reports he was feeling lightheaded earlier had a family member checked his blood pressure.  Reports a systolic pressure of 200 at that time.  Upon arrival to the ER, blood pressure was 167/111.  After 15 minutes of rest, /103.  He is currently asymptomatic.  Is followed by primary care.      Review of patient's allergies indicates:  No Known Allergies  Past Medical History:   Diagnosis Date    Cardiomegaly     Hyperlipidemia     Hypertension     Palpitations      History reviewed. No pertinent surgical history.  Family History   Family history unknown: Yes     Social History[1]  Review of Systems   Respiratory: Negative.     Cardiovascular: Negative.    Neurological: Negative.        Physical Exam     Initial Vitals [07/24/25 1804]   BP Pulse Resp Temp SpO2   (!) 167/111 66 18 98.2 °F (36.8 °C) 97 %      MAP       --         Physical Exam    Nursing note and vitals reviewed.  Constitutional: No distress.   HENT:   Head: Normocephalic and atraumatic.   Eyes: EOM are normal. Pupils are equal, round, and reactive to light.   Neck: Neck supple.   Cardiovascular:  Normal rate, regular rhythm and normal heart sounds.           Pulmonary/Chest: Breath sounds normal. No respiratory distress.   Musculoskeletal:         General: Normal range of motion.      Cervical back: Neck supple.     Neurological: He is alert and oriented to person, place, and time. No cranial nerve deficit. GCS score is 15. GCS eye subscore is 4. GCS verbal subscore is 5. GCS motor subscore is 6.   Skin: Skin is warm and dry. Capillary refill takes less than 2 seconds.         Medical Screening Exam   See Full Note    ED Course   Procedures  Labs Reviewed - No data to display       Imaging Results    None          Medications - No data to display  Medical Decision  Making  Patient reports labile blood pressure today.  Does report similar symptoms recently.  Has not checked his blood pressure until today.  Also reports he drinks a half pt to 1 pt of liquor daily.  He is currently asymptomatic with a reasonable blood pressure at 144/103.  Recommend maintaining a blood pressure log and following up with primary care.  Return to the ER if lightheadedness returns, markedly elevated blood pressure or concerns otherwise.                                      Clinical Impression:   Final diagnoses:  [I10] Uncontrolled hypertension (Primary)        ED Disposition Condition    Discharge Stable          ED Prescriptions    None       Follow-up Information    None            [1]   Social History  Tobacco Use    Smoking status: Never    Smokeless tobacco: Never   Substance Use Topics    Alcohol use: Yes     Comment: drank last night, drink of choice crown    Drug use: Never        Felipe Rodriguez FNP  07/24/25 3348

## 2025-07-25 ENCOUNTER — TELEPHONE (OUTPATIENT)
Dept: EMERGENCY MEDICINE | Facility: HOSPITAL | Age: 60
End: 2025-07-25
Payer: COMMERCIAL

## 2025-08-21 ENCOUNTER — OFFICE VISIT (OUTPATIENT)
Dept: FAMILY MEDICINE | Facility: CLINIC | Age: 60
End: 2025-08-21
Payer: COMMERCIAL

## 2025-08-21 VITALS
HEIGHT: 72 IN | TEMPERATURE: 99 F | OXYGEN SATURATION: 97 % | WEIGHT: 281 LBS | HEART RATE: 65 BPM | BODY MASS INDEX: 38.06 KG/M2 | RESPIRATION RATE: 17 BRPM | SYSTOLIC BLOOD PRESSURE: 130 MMHG | DIASTOLIC BLOOD PRESSURE: 86 MMHG

## 2025-08-21 DIAGNOSIS — M15.0 PRIMARY OSTEOARTHRITIS INVOLVING MULTIPLE JOINTS: ICD-10-CM

## 2025-08-21 DIAGNOSIS — R73.03 PREDIABETES: ICD-10-CM

## 2025-08-21 DIAGNOSIS — M54.16 LUMBAR RADICULOPATHY: ICD-10-CM

## 2025-08-21 DIAGNOSIS — E78.2 MIXED HYPERLIPIDEMIA: ICD-10-CM

## 2025-08-21 DIAGNOSIS — E55.9 VITAMIN D DEFICIENCY: ICD-10-CM

## 2025-08-21 DIAGNOSIS — E66.812 CLASS 2 OBESITY DUE TO EXCESS CALORIES WITH BODY MASS INDEX (BMI) OF 38.0 TO 38.9 IN ADULT, UNSPECIFIED WHETHER SERIOUS COMORBIDITY PRESENT: ICD-10-CM

## 2025-08-21 DIAGNOSIS — K59.04 CHRONIC IDIOPATHIC CONSTIPATION: ICD-10-CM

## 2025-08-21 DIAGNOSIS — E66.09 CLASS 2 OBESITY DUE TO EXCESS CALORIES WITH BODY MASS INDEX (BMI) OF 38.0 TO 38.9 IN ADULT, UNSPECIFIED WHETHER SERIOUS COMORBIDITY PRESENT: ICD-10-CM

## 2025-08-21 DIAGNOSIS — I10 ESSENTIAL HYPERTENSION: Primary | ICD-10-CM

## 2025-08-21 DIAGNOSIS — M79.2 NERVE PAIN: ICD-10-CM

## 2025-08-21 DIAGNOSIS — R10.9 FLANK PAIN: ICD-10-CM

## 2025-08-21 DIAGNOSIS — N40.0 BENIGN PROSTATIC HYPERPLASIA, UNSPECIFIED WHETHER LOWER URINARY TRACT SYMPTOMS PRESENT: ICD-10-CM

## 2025-08-21 LAB
25(OH)D3 SERPL-MCNC: 25.4 NG/ML (ref 30–80)
ALBUMIN SERPL BCP-MCNC: 3.5 G/DL (ref 3.4–4.8)
ALBUMIN/GLOB SERPL: 0.7 {RATIO}
ALP SERPL-CCNC: 66 U/L (ref 40–150)
ALT SERPL W P-5'-P-CCNC: 42 U/L
ANION GAP SERPL CALCULATED.3IONS-SCNC: 10 MMOL/L (ref 7–16)
AST SERPL W P-5'-P-CCNC: 58 U/L (ref 11–45)
BASOPHILS # BLD AUTO: 0.02 K/UL (ref 0–0.2)
BASOPHILS NFR BLD AUTO: 0.5 % (ref 0–1)
BILIRUB SERPL-MCNC: 0.6 MG/DL
BILIRUB UR QL STRIP: NEGATIVE
BUN SERPL-MCNC: 15 MG/DL (ref 8–26)
BUN/CREAT SERPL: 19 (ref 6–20)
CALCIUM SERPL-MCNC: 9.5 MG/DL (ref 8.8–10)
CHLORIDE SERPL-SCNC: 103 MMOL/L (ref 98–107)
CHOLEST SERPL-MCNC: 142 MG/DL
CHOLEST/HDLC SERPL: 4.4 {RATIO}
CLARITY UR: CLEAR
CO2 SERPL-SCNC: 27 MMOL/L (ref 23–31)
COLOR UR: YELLOW
CREAT SERPL-MCNC: 0.77 MG/DL (ref 0.72–1.25)
CREAT UR-MCNC: 218 MG/DL (ref 23–375)
DIFFERENTIAL METHOD BLD: ABNORMAL
EGFR (NO RACE VARIABLE) (RUSH/TITUS): 102 ML/MIN/1.73M2
EOSINOPHIL # BLD AUTO: 0.33 K/UL (ref 0–0.5)
EOSINOPHIL NFR BLD AUTO: 8.5 % (ref 1–4)
ERYTHROCYTE [DISTWIDTH] IN BLOOD BY AUTOMATED COUNT: 13.8 % (ref 11.5–14.5)
EST. AVERAGE GLUCOSE BLD GHB EST-MCNC: 100 MG/DL
GLOBULIN SER-MCNC: 5.1 G/DL (ref 2–4)
GLUCOSE SERPL-MCNC: 91 MG/DL (ref 82–115)
GLUCOSE UR STRIP-MCNC: NORMAL MG/DL
HBA1C MFR BLD HPLC: 5.1 %
HCT VFR BLD AUTO: 49.2 % (ref 40–54)
HDLC SERPL-MCNC: 32 MG/DL (ref 35–60)
HGB BLD-MCNC: 16.2 G/DL (ref 13.5–18)
IMM GRANULOCYTES # BLD AUTO: 0 K/UL (ref 0–0.04)
IMM GRANULOCYTES NFR BLD: 0 % (ref 0–0.4)
KETONES UR STRIP-SCNC: NEGATIVE MG/DL
LDLC SERPL CALC-MCNC: 90 MG/DL
LDLC/HDLC SERPL: 2.8 {RATIO}
LEUKOCYTE ESTERASE UR QL STRIP: NEGATIVE
LYMPHOCYTES # BLD AUTO: 1.24 K/UL (ref 1–4.8)
LYMPHOCYTES NFR BLD AUTO: 32.1 % (ref 27–41)
MCH RBC QN AUTO: 31.4 PG (ref 27–31)
MCHC RBC AUTO-ENTMCNC: 32.9 G/DL (ref 32–36)
MCV RBC AUTO: 95.3 FL (ref 80–96)
MICROALBUMIN UR-MCNC: 4.4 MG/DL
MICROALBUMIN/CREAT RATIO PNL UR: 20.2 MG/G (ref 0–30)
MONOCYTES # BLD AUTO: 0.42 K/UL (ref 0–0.8)
MONOCYTES NFR BLD AUTO: 10.9 % (ref 2–6)
MPC BLD CALC-MCNC: 11.3 FL (ref 9.4–12.4)
NEUTROPHILS # BLD AUTO: 1.85 K/UL (ref 1.8–7.7)
NEUTROPHILS NFR BLD AUTO: 48 % (ref 53–65)
NITRITE UR QL STRIP: NEGATIVE
NONHDLC SERPL-MCNC: 110 MG/DL
NRBC # BLD AUTO: 0 X10E3/UL
NRBC, AUTO (.00): 0 %
PH UR STRIP: 6 PH UNITS
PLATELET # BLD AUTO: 201 K/UL (ref 150–400)
POTASSIUM SERPL-SCNC: 3.1 MMOL/L (ref 3.5–5.1)
PROT SERPL-MCNC: 8.6 G/DL (ref 5.8–7.6)
PROT UR QL STRIP: NEGATIVE
RBC # BLD AUTO: 5.16 M/UL (ref 4.6–6.2)
RBC # UR STRIP: NEGATIVE /UL
SODIUM SERPL-SCNC: 137 MMOL/L (ref 136–145)
SP GR UR STRIP: 1.03
TRIGL SERPL-MCNC: 99 MG/DL (ref 34–140)
UROBILINOGEN UR STRIP-ACNC: 2 MG/DL
VLDLC SERPL-MCNC: 20 MG/DL
WBC # BLD AUTO: 3.86 K/UL (ref 4.5–11)

## 2025-08-21 PROCEDURE — 82570 ASSAY OF URINE CREATININE: CPT | Mod: ,,, | Performed by: CLINICAL MEDICAL LABORATORY

## 2025-08-21 PROCEDURE — 85025 COMPLETE CBC W/AUTO DIFF WBC: CPT | Mod: ,,, | Performed by: CLINICAL MEDICAL LABORATORY

## 2025-08-21 PROCEDURE — 81003 URINALYSIS AUTO W/O SCOPE: CPT | Mod: QW,,, | Performed by: CLINICAL MEDICAL LABORATORY

## 2025-08-21 PROCEDURE — 83036 HEMOGLOBIN GLYCOSYLATED A1C: CPT | Mod: ,,, | Performed by: CLINICAL MEDICAL LABORATORY

## 2025-08-21 PROCEDURE — 82306 VITAMIN D 25 HYDROXY: CPT | Mod: ,,, | Performed by: CLINICAL MEDICAL LABORATORY

## 2025-08-21 PROCEDURE — 80053 COMPREHEN METABOLIC PANEL: CPT | Mod: ,,, | Performed by: CLINICAL MEDICAL LABORATORY

## 2025-08-21 PROCEDURE — 82043 UR ALBUMIN QUANTITATIVE: CPT | Mod: ,,, | Performed by: CLINICAL MEDICAL LABORATORY

## 2025-08-21 PROCEDURE — 80061 LIPID PANEL: CPT | Mod: ,,, | Performed by: CLINICAL MEDICAL LABORATORY

## 2025-08-21 RX ORDER — GABAPENTIN 100 MG/1
CAPSULE ORAL
Qty: 90 CAPSULE | Refills: 2 | Status: SHIPPED | OUTPATIENT
Start: 2025-08-21

## 2025-08-27 ENCOUNTER — RESULTS FOLLOW-UP (OUTPATIENT)
Dept: FAMILY MEDICINE | Facility: CLINIC | Age: 60
End: 2025-08-27
Payer: COMMERCIAL

## 2025-08-27 DIAGNOSIS — E87.6 HYPOKALEMIA: ICD-10-CM

## 2025-09-03 RX ORDER — ERGOCALCIFEROL 1.25 MG/1
50000 CAPSULE ORAL
Qty: 12 CAPSULE | Refills: 0 | Status: SHIPPED | OUTPATIENT
Start: 2025-09-03

## 2025-09-03 RX ORDER — POTASSIUM CHLORIDE 20 MEQ/1
TABLET, EXTENDED RELEASE ORAL
Qty: 180 TABLET | Refills: 1 | Status: SHIPPED | OUTPATIENT
Start: 2025-09-03